# Patient Record
Sex: MALE | Race: BLACK OR AFRICAN AMERICAN | URBAN - METROPOLITAN AREA
[De-identification: names, ages, dates, MRNs, and addresses within clinical notes are randomized per-mention and may not be internally consistent; named-entity substitution may affect disease eponyms.]

---

## 2017-12-13 ENCOUNTER — HISTORICAL (OUTPATIENT)
Dept: ADMINISTRATIVE | Facility: HOSPITAL | Age: 42
End: 2017-12-13

## 2017-12-18 LAB
FINAL CULTURE: NORMAL
FINAL CULTURE: NORMAL

## 2022-05-02 NOTE — HISTORICAL [facility] CERNER
This is a historical note converted from Maria Dolores. Formatting and pictures may have been removed.  Please reference Maria Dolores for original formatting and attached multimedia. Reason for Consultation  buttock abscess  History of Present Illness  43 yo M with T2DM presented to ED with buttock abscess and in DKA. Abscess has been present for ~1 week and is associated with pain, erythema, and purulent drainage. Patient denies fever/chills.  Review of Systems  Negative except as described above  Physical Exam  Vitals & Measurements  T:?36.8? ?C ?(Oral)? HR:?95?(Peripheral)? RR:?18? BP:?115/70? SpO2:?93%? WT:?113.2?kg? WT:?113.2?kg?  NAD, AOx3  NCAT, MMM  Tachycardic, regular rhythm. Lungs CTAB  Abd soft, NTND  Abscess on buttock with purulent drainage, induration with evidence of previous wound opening?  ?  R/B/A of I+D discussed with patient, and patient was consented. The skin was then prepped in a sterile fashion and local anesthetic was administered. After local anesthesia was achieved, an incision was made and the surrounding subcutaneous tissue was spread to allow drainage. Surrounding tissue was sharply debrided. After drainage and debridement, the wound was packed and dressed.  Assessment/Plan  43 yo M with buttock abscess s/p I+D and tissue debridement  ?  -Abscess I+D/tissue debridement performed at bedside  ?  -Recommend vancomycin  ?  -Gensurg will follow patient  ?   Problem List/Past Medical History  Ongoing  Diabetes mellitus type 2 in obese  Obesity  Historical  Procedure/Surgical History  abdominal surgery to umbilicus as a kid  Medications  Inpatient  Dextrose 5% with 0.45% NaCl 1,000 mL, 1000 mL, IV  Dextrose 50% abboject syringe, 12.5 gm= 25 mL, IV Push, q15min, PRN  Lovenox, 40 mg= 0.4 mL, Subcutaneous, Daily  Magnesium Sulfate 2gm/50ml IVPB (Premix), 2 gm= 50 mL, IV Piggyback, As Indicated, PRN  Milk of Magnesia, 30 mL, Oral, Daily, PRN  Novolin R infusion additive 100 units + Sodium Chloride 0.9% 100ml  99 mL  Novolin R infusion additive 100 units + Sodium Chloride 0.9% 100ml 99 mL  NS (0.9% Sodium Chloride) Infusion 1,000 mL, 1000 mL, IV  Sodium Chloride 0.45% 1,000 mL, 1000 mL, IV  Sodium Chloride 0.9% 1,000 mL, 1000 mL, IV  Sodium Chloride 0.9% 1,000 mL, 1000 mL, IV  Sodium Chloride 0.9% 1,000 mL, 1000 mL, IV  sodium phosphate (for IVPB) + Sodium Chloride 0.9% 250ml 250 mL  sodium phosphate (for IVPB) + Sodium Chloride 0.9% 250ml 250 mL  Tylenol, 650 mg= 2 tab(s), Oral, q4hr, PRN  Tylenol, 650 mg= 2 tab(s), Oral, q4hr, PRN  Zofran, 4 mg= 2 mL, IV Push, q4hr, PRN  Zofran, 4 mg= 2 mL, IV Push, q4hr, PRN  Zosyn 3.375 gm (for IVPB), 3.375 gm= 50 mL, IV Piggyback, [id number]  Home  Glucometer, See Instructions,? ?Not taking  metFORMIN 1000 mg oral tablet, 1000 mg= 1 tab(s), Oral, BID, 1 refills,? ?Not taking  metformin 500 mg oral tablet, 1000 mg= 2 tab(s), Oral, Once,? ?Not taking  Allergies  No Known Medication Allergies  Social History  Alcohol - 12/13/2017  Never  Substance Abuse - 12/13/2017  Current, Marijuana, 3-5 times per week  Tobacco - 12/13/2017  Never smoker

## 2022-05-02 NOTE — HISTORICAL [facility] CERNER
This is a historical note converted from Maria Dolores. Formatting and pictures may have been removed.  Please reference Maria Dolores for original formatting and attached multimedia. Chief Complaint  pt. reports to the ed c/o buttock pain and possible ketoacidosis....pt. was seen at [facility] had labs evaluated with a positive serum ketone....history of dm, current bvq=804 mg/dl, vss, nadn...  History of Present Illness  41yo AAM with hx of DM2 treated at [facility] with Metformin, admitted to [facility] on 12/13/17 for DKA. Diagnosed with DM2 about 1 month ago. Non-compliant with metformin.?Found to have CBG in 500s as well as serum ketones at [facility]?Clinic?and was instructed to present to ED for evaluation. In the ED, has complaints of buttox pain for the past week, on exam was inflamed and swollen. Worse when sitting, better with repositioning. States non-compliance with PO metformin. No CP, no SOB, no fever, no chills, no abd pain, no muscle cramping.  Review of Systems  Constitutional symptoms: No fever, no chills.  Skin symptoms: +rash, no pruritus,?+ lesion.  Eye symptoms: No recent vision problems,  ENMT symptoms: no sore throat, no nasal congestion.  Respiratory symptoms: No shortness of breath, no cough.  Cardiovascular symptoms: No chest pain, no peripheral edema.  Gastrointestinal symptoms: No abdominal pain, no nausea, no vomiting, no diarrhea.  Genitourinary symptoms: No dysuria, no hematuria.  Musculoskeletal symptoms: No Joint pain,  Neurologic symptoms: No headache, no dizziness.  Psychiatric symptoms: No anxiety, No depression  Hematologic/Lymphatic symptoms: Bleeding tendency negative,  Additional review of systems information: All other systems reviewed and otherwise negative, All systems reviewed as documented in chart.  Physical Exam  Vitals & Measurements  T:?36.8? ?C ?(Oral)? HR:?95?(Peripheral)? RR:?18? BP:?115/70? SpO2:?93%? WT:?113.2?kg? WT:?113.2?kg?  General: A&O,  NAD  Eye: PERRLA, EOMI, nml conjunctiva  Respiratory: CTA in all lung fields, non-labored, BS equal  Cardiovascular: RRR no M/R/G, No edema  GI: Soft, NT, active BS  : Soft, No CVA tenderness, No suprapubic tenderness  Skin: Warm, left gluteal cleft abscess, tense and mild erythema  Neuro: Cranial Nerves II-XII are grossly intact, No focal deficits, Normal motor function  Psychiatric: Cooperative, Appropriate mood & affect  Assessment/Plan  1. DKA - protocol initiated in ED  2. Left Gluteal Abscess - Bedside I&D by Sx team  3. Obesity  ?  Admit to ICU on DKA protocol. Possible precipitants include infection as has large abscess which was I&D by surgery team. Cultures drawn and empiric Vancomycin and Zosyn initiated. Continue DKA protocol with aggressive IVF hydration. Correct electrolytes as indicated. When appropriate, transition to SQ insulin, possibly later today and advance diet to DM diet.  ?  Consults: Surgery  PPX: Lovenox  Dispo: ICU admit, will have to delay discharge until blood culture return.  ?  ?  Medications (22) Active  Scheduled: (3)  enoxaparin 40mg/0.4ml Inj ?40 mg 0.4 mL, Subcutaneous, Daily  piperacillin-tazobactam ?3.375 gm 50 mL, IV Piggyback, [id number] (6-12-6-12)  vancomycin ?1 gm, IV Piggyback, q12hr  Continuous: (8)  D5W-1/2NS 1,000 mL ?1,000 mL, IV, 150 mL/hr  insulin regular 100 units + sodium chloride 0.9% 99 mL ?99 mL, IV, 8 mL/hr  insulin regular 100 units + sodium chloride 0.9% 99 mL ?99 mL, IV  sodium chloride 0.45% 1,000 mL ?1,000 mL, IV, 150 mL/hr  sodium chloride 0.9% 1,000 mL ?1,000 mL, IV, 1,000 mL/hr  sodium chloride 0.9% 1,000 mL ?1,000 mL, IV, 1,000 mL/hr  sodium chloride 0.9% 1,000 mL ?1,000 mL, IV, 500 mL/hr  sodium chloride 0.9% 1,000 mL ?1,000 mL, IV, 150 mL/hr  PRN: (11)  acetaminophen 325 mg Tab ?650 mg 2 tab(s), Oral, q4hr  acetaminophen 325 mg Tab ?650 mg 2 tab(s), Oral, q4hr  dextrose 50% abboj ?12.5 gm 25 mL, IV Push, q15min  HYDROmorphone 1 mg/mL Soln ?1 mg 1  mL, IV, Once-NOW  lidocaine 1% MDV ?Inj-20ml ?20 mL, EPI, As Directed  magnesium hydroxide 8% Harika (MOM) UD ?30 mL, Oral, Daily  magnesium sulfate INJ ?2 gm 50 mL, IV Piggyback, As Indicated  ondansetron 2 mg/mL inj - 2mL ?4 mg 2 mL, IV Push, q4hr  ondansetron 2 mg/mL inj - 2mL ?4 mg 2 mL, IV Push, q4hr  sodium phosphate + sodium chloride 0.9% 250 mL ?20 mmol 6.67 mL, IV Piggyback, As Directed  sodium phosphate + sodium chloride 0.9% 250 mL ?30 mmol 10 mL, IV Piggyback, As Directed  ?   Problem List/Past Medical History  Ongoing  Diabetes mellitus type 2 in obese  Obesity  Historical  Procedure/Surgical History  abdominal surgery to umbilicus as a kid  Medications  Inpatient  Dextrose 5% with 0.45% NaCl 1,000 mL, 1000 mL, IV  Dextrose 50% abboject syringe, 25 mL, IV Push, q15min, PRN  Lovenox, 40 mg= 0.4 mL, Subcutaneous, Daily  Magnesium Sulfate 2gm/50ml IVPB (Premix), 2 gm= 50 mL, IV Piggyback, As Indicated, PRN  Milk of Magnesia, 30 mL, Oral, Daily, PRN  Novolin R infusion additive 100 units + Sodium Chloride 0.9% 100ml 100 mL  Novolin R infusion additive 100 units + Sodium Chloride 0.9% 100ml 99 mL  NS (0.9% Sodium Chloride) Infusion 1,000 mL, 1000 mL, IV  Sodium Chloride 0.45% 1,000 mL, 1000 mL, IV  Sodium Chloride 0.9% 1,000 mL, 1000 mL, IV  Sodium Chloride 0.9% 1,000 mL, 1000 mL, IV  Sodium Chloride 0.9% 1,000 mL, 1000 mL, IV  sodium phosphate (for IVPB) + Sodium Chloride 0.9% 250ml 250 mL  sodium phosphate (for IVPB) + Sodium Chloride 0.9% 250ml 250 mL  Tylenol, 650 mg= 2 tab(s), Oral, q4hr, PRN  Tylenol, 650 mg= 2 tab(s), Oral, q4hr, PRN  Zofran, 4 mg= 2 mL, IV Push, q4hr, PRN  Zofran, 4 mg= 2 mL, IV Push, q4hr, PRN  Zosyn 3.375 gm (for IVPB), 3.375 gm= 50 mL, IV Piggyback, [id number]  Home  Glucometer, See Instructions,? ?Not taking  metFORMIN 1000 mg oral tablet, 1000 mg= 1 tab(s), Oral, BID, 1 refills,? ?Not taking  metformin 500 mg oral tablet, 1000 mg= 2 tab(s), Oral, Once,? ?Not taking  Allergies  No  Known Medication Allergies  Social History  Alcohol - 12/13/2017  Never  Substance Abuse - 12/13/2017  Current, Marijuana, 3-5 times per week  Tobacco - 12/13/2017  Never smoker  Lab Results  Labs Last 24 Hours?  ?Chemistry Hematology/Coagulation   Sodium Lvl:?133 mmol/L?Low (12/13/17 13:46:11) PT: 13.3 second(s) (12/13/17 13:36:18)   Potassium Lvl: 3.8 mmol/L (12/13/17 13:46:11) INR: 1.03 (12/13/17 13:36:18)   Chloride:?95 mmol/L?Low (12/13/17 13:46:11) PTT: 25.8 second(s) (12/13/17 13:36:19)   CO2:?20 mmol/L?Low (12/13/17 13:46:11) WBC: 8.8 x10(3)/mcL (12/13/17 13:21:38)   Calcium Lvl: 9 mg/dL (12/13/17 13:46:11) RBC: 5.71 x10(6)/mcL (12/13/17 13:21:38)   Glucose Lvl:?532 mg/dL?Critical (12/13/17 13:46:11) Hgb: 14.6 gm/dL (12/13/17 13:21:38)   BUN: 13 mg/dL (12/13/17 13:46:11) Hct: 46.5 % (12/13/17 13:21:38)   Creatinine: 1.3 mg/dL (12/13/17 13:46:11) Platelet: 195 x10(3)/mcL (12/13/17 13:21:38)   eGFR-AA:?78 mL/min?Low (12/13/17 13:46:12) MCV: 81.4 fL (12/13/17 13:21:38)   eGFR-MELANY:?64 mL/min?Low (12/13/17 13:46:13) MCH:?25.6 pg?Low (12/13/17 13:21:38)   Bili Total: 0.4 mg/dL (12/13/17 13:46:11) MCHC: 31.4 gm/dL (12/13/17 13:21:38)   Bili Direct: 0.1 mg/dL (12/13/17 13:46:11) RDW: 14 % (12/13/17 13:21:38)   Bili Indirect: 0.3 mg/dL (12/13/17 13:46:11) MPV:?11.4 fL?High (12/13/17 13:21:38)   AST:?12 unit/L?Low (12/13/17 13:46:11) Abs Neut: 6.43 x10(3)/mcL (12/13/17 13:21:38)   ALT: 25 unit/L (12/13/17 13:46:11) Neutro Auto: 73 x10(3)/mcL (12/13/17 13:21:40)   Alk Phos:?121 unit/L?High (12/13/17 13:46:11) Lymph Auto: 15 % (12/13/17 13:21:40)   Total Protein: 7.1 gm/dL (12/13/17 13:46:11) Mono Auto: 9 % (12/13/17 13:21:40)   Albumin Lvl:?3.2 gm/dL?Low (12/13/17 13:46:11) Eos Auto: 1 % (12/13/17 13:21:40)   Globulin:?3.9 gm/mL?High (12/13/17 13:46:11) Abs Eos: 0.06 (12/13/17 13:21:40)   A/G Ratio: 1 ratio (12/13/17 13:46:11) Basophil Auto: 1 % (12/13/17 13:21:40)   BOHB:?4.57 mmol/L?High (12/13/17 13:43:47) Abs  Neutro: 6.43 x10(3)/mcL (12/13/17 13:21:40)    Abs Lymph: 1.34 x10(3)/mcL (12/13/17 13:21:40)    Abs Mono: 0.77 x10(3)/mcL (12/13/17 13:21:40)    Abs Baso: 0.08 x10(3)/mcL (12/13/17 13:21:40)    IG%: 1 % (12/13/17 13:21:40)    IG#: 0.12 (12/13/17 13:21:40)   ?  ?  ?  ?  Diagnostic Results  Accession:?[id number]  Order:?XR Chest 1 View  Report Dt/Tm:?12/13/2017 14:35  Report:?  one view of the chest  ?  CPT 30393  ?  HISTORY:  Other (please specify)  ?  FINDINGS:  Examination reveals mediastinal and cardiac silhouettes to be within  normal limits. Lung fields are clear and free of gross infiltrates  atelectases or effusions  ?  IMPRESSION: No active pulmonary disease  ?  ?  ?

## 2022-05-02 NOTE — HISTORICAL [facility] CERNER
This is a historical note converted from Maria Dolores. Formatting and pictures may have been removed.  Please reference Maria Dolores for original formatting and attached multimedia. Admission Information  41yo AAM with hx of DM2 treated at [facility] with Metformin, admitted to [facility] on 12/13/17 for DKA. Diagnosed with DM2 about 1 month ago. Non-compliant with metformin.?Found to have CBG in 500s as well as serum ketones at [facility]?Clinic?and was instructed to present to ED for evaluation. In the ED, has complaints of buttox pain for the past week, on exam was inflamed and swollen. Worse when sitting, better with repositioning. States non-compliance with PO metformin. No CP, no SOB, no fever, no chills, no abd pain, no muscle cramping.  ?   HD2: Awake and alert, feeling much better. Transitioned to PO diet and SQ insulin. No cough, no fever, no chills, no pain  HD3: Awake and alert, NAD. States to understand how to administer his own insulin. States received DM education. States to be pleased with his hospital stay. No new issues.  Hospital Course  Significant Findings  Last Month?  ??Lipid Profile: ?Hematology:   ?: ?() ?: ?()   ?: ?() ?: ?()   ?: ?() ?: ?()   ?LDL: 143 mg/dL (12/14/17) ?: ?()   ? ?INR: 1.03 ?(12/13/17)   ?  ?  Additional - Last Month?  ??% Sat Alex: 63.5 % (12/13/17) ?A/G Ratio: 1 ratio (12/15/17) ?Abn Lymph Man: 5 % (12/15/17)   ?Abs Baso: 0.10 x10(3)/mcL (12/14/17) ?Abs Eos: 0.15 ?(12/14/17) ?Abs Lymph: 2.17 x10(3)/mcL (12/14/17)   ?Abs Mono: 0.92 x10(3)/mcL (12/14/17) ?Abs Neut: 3.10 x10(3)/mcL (12/15/17) ?Abs Neutro: 5.45 x10(3)/mcL (12/14/17)   ?Albumin Lvl: 2.7 gm/dL (12/15/17) ?Alk Phos: 86 unit/L (12/15/17) ?ALT: 20 unit/L (12/15/17)   ?Anisocyte: 1+ ?(12/15/17) ?AST: 11 unit/L (12/15/17) ?Band Man: 9 % (12/15/17)   ?Basophil Auto: 1 % (12/14/17) ?Basophil Man: 0 % (12/15/17) ?Bili Direct: 0.1 mg/dL (12/15/17)   ?Bili Indirect: 0.3 mg/dL (12/15/17) ?Bili Total: 0.4 mg/dL (12/15/17)  ?BOHB: 4.79 mmol/L (17)   ?BUN: 7 mg/dL (12/15/17) ?Calcium Lvl: 8.6 mg/dL (12/15/17) ?Chloride: 104 mmol/L (12/15/17)   ?Chol: 209 mg/dL (17) ?Chol/HDL: 7.0 ?(17) ?CO Hgb Alex: 2.4 % (17)   ?CO2: 36 mmol/L (12/15/17) ?CO2 Totl Alex: 23.5 ?(17) ?Creatinine: 0.80 mg/dL (12/15/17)   ?CRP: CRP ?(17) ?D Base Alex: -6.0 ?(17) ?EA mg/dL (17)   ?eGFR-AA: >105 mL/min (12/15/17) ?eGFR-MELANY: >105 mL/min (12/15/17) ?Eos Auto: 2 % (17)   ?Eos Man: 5 % (12/15/17) ?Globulin: 3.50 gm/mL (12/15/17) ?Glucose Lvl: 188 mg/dL (12/15/17)   ?HCO3 Alex: 22 mmol/L (17) ?Hct: 43.8 % (12/15/17) ?HDL: 30 mg/dL (17)   ?Hep A IgM: Nonreactive ?(17) ?Hep B Core IgM: Nonreactive ?(17) ?Hep Bs Ag: Negative ?(17)   ?Hep C Ab: Nonreactive ?(17) ?Hgb: 14.0 gm/dL (12/15/17) ?Hgb A1c: 14.7 % (17)   ?HIV: Nonreactive ?(17) ?IG#: 0.1200 ?(17) ?IG%: 1 % (17)   ?Lactic Acid Lvl: 0.9 mmol/L (17) ?Lymph Auto: 24 % (17) ?Lymph Man: 26.0 % (12/15/17)   ?Macrocyte: 1+ ?(12/15/17) ?Magnesium Lvl: 1.7 mg/dL (17) ?MCH: 25.6 pg (12/15/17)   ?MCHC: 32.0 gm/dL (12/15/17) ?MCV: 80.2 fL (12/15/17) ?Met Hgb Alex: 0.5 % (17)   ?Microcyte: 1+ ?(12/15/17) ?Mono Auto: 10 % (17) ?Monocyte Man: 19 % (12/15/17)   ?MPV: 11.1 fL (12/15/17) ?Myelo Man: 2 % (12/15/17) ?Neutro Auto: 61 x10(3)/mcL (17)   ?O2 Hgb Alex: 61.7 % (17) ?pCO2 Alex: 51.0 mmHg (17) ?pH Alex: 7.24 ?(17)   ?Phosphorus: 2.4 mg/dL (17) ?Platelet: 200 x10(3)/mcL (12/15/17) ?Platelet Est: Adequate ?(12/15/17)   ?pO2 Alex: 31.0 mmHg (17) ?Potassium Lvl: 3.4 mmol/L (12/15/17) ?PT: Prothrombin Time ?(17)   ?PTT: Partial Thromboplastin Time ?(17) ?RBC: 5.46 x10(6)/mcL (12/15/17) ?RBC Morph: Abnormal ?(12/15/17)   ?RDW: 13.9 % (12/15/17) ?Sample Alex: venous ?(17) ?Sed Rate: 28 mm/hr (17)   ?Segs Man: 34 % (12/15/17) ?Site  Alex: Alex Line ?(17) ?Sodium Lvl: 144 mmol/L (12/15/17)   ?THB Alex: 15.0 gm/dL (17) ?Total Protein: 6.2 gm/dL (12/15/17) ?Treatment Alex: room air ?(17)   ?Tri mg/dL (17) ?UA Appear: Clear ?(17) ?UA Bact Interp: None Seen ?(17)   ?UA Bili: - mg/dL (17) ?UA Blood: - mg/dL (17) ?UA Color: COLORLESS ?(17)   ?UA Glucose: >1000 mg/dL (17) ?UA Hyal Cast Interp: 0-2 ?(17) ?UA Ketones: 100 mg/dL (17)   ?UA Leuk Est: - Gautam/uL (17) ?UA Nitrite: - ?(17) ?UA pH: 5.0 ?(17)   ?UA Protein: - mg/dL (17) ?UA RBC Interp: 0-2 ?(17) ?UA Spec Grav: 1.025 ?(17)   ?UA Squam Epi Interp: <1 ?(17) ?UA Urobilinogen: NORMAL mg/dL (17) ?UA WBC Interp: 0-2 /HPF (17)   ?Vanc Lvl Random: 8.2 mcg/mL (17) ?VLDL: 36 mg/dL (17) ?WBC: 6.0 x10(3)/mcL (12/15/17)   ?  ?  Accession:?[id number]  Order:?XR Chest 1 View  Report Dt/Tm:?2017 14:35  Report:?  one view of the chest  ?  CPT 47952  ?  HISTORY:  Other (please specify)  ?  FINDINGS:  Examination reveals mediastinal and cardiac silhouettes to be within  normal limits. Lung fields are clear and free of gross infiltrates  atelectases or effusions  ?  IMPRESSION: No active pulmonary disease  ?  ?  ?  C Blood  ?No growth at 2 days.  ?Collected: 2017 14:08  ??? ? ? Verified by: MICROBIOLOGY, [facility] - 12/15/2017 15:06  ?No growth at 2 days.  ?Collected: 2017 12:56  ??? ? ? Verified by: MICROBIOLOGY, [facility] - 12/15/2017 15:06  ??  ??  Procedures and Treatment Provided  Medications (14) Active  Scheduled: (5)  dextrose 50% abboj ?25 gm 50 mL, IV Push, As Directed  insulin (LanTUS) glargine 100 u/ml Sol PEN ?25 units 0.25 mL, Subcutaneous, At Bedtime  lisinopril 10 mg Tab UD ?10 mg 1 tab(s), Oral, Daily  piperacillin-tazobactam ?3.375 gm 50 mL, IV Piggyback, q8hr  vancomycin ?1,500 mg 1.5 EA, IV Piggyback,  q12hr  Continuous: (0)  PRN: (9)  dextrose 50% abboj ?12.5 gm 25 mL, IV Push, As Directed  dextrose 50% abboj ?12.5 gm 25 mL, IV Push, Once  dextrose 50% abboj ?12.5 gm 25 mL, IV Push, As Directed  glucagon recombinant 1 mg Inj ?1 mg 1 EA, IM, q10min  glucagon recombinant 1 mg Inj ?1 mg 1 EA, IM, q10min  glucose 40% oral gel ?15 gm 0.5 tube(s), Oral, As Directed  HYDROmorphone 1 mg/mL Soln ?1 mg 1 mL, IV, Once-NOW  insulin (Humalog) lispro 100 u/ml Inj ?3-21 units, Subcutaneous, As Directed  lidocaine 1% MDV ?Inj-20ml ?20 mL, EPI, As Directed  ?  ?  12/13/17:  -Abscess I+D/tissue debridement performed at bedside, no complications, tolerated well  Physical Exam  Vitals & Measurements  T:?37.0? ?C ?(Oral)? TMIN:?36.7? ?C ?(Oral)? TMAX:?37.3? ?C ?(Oral)? HR:?90?(Peripheral)? RR:?16? BP:?118/72? SpO2:?97%? WT:?112.3?kg?  General: A&O, NAD  Eye: PERRLA, EOMI, nml conjunctiva  Respiratory: CTA in all lung fields, non-labored, BS equal  Cardiovascular: RRR no M/R/G, No edema  GI: Soft, NT, active BS  : Soft, No CVA tenderness, No suprapubic tenderness  Skin: Warm, left gluteal cleft abscess +dressing in place  Neuro: Cranial Nerves II-XII are grossly intact, No focal deficits, Normal motor function  Psychiatric: Cooperative, Appropriate mood & affect  Discharge Plan  1. DKA - Resolved, transitioned to SQ insulin  2. Left Gluteal Abscess - Bedside I&D by Sx team  3. Obesity  ?   Home Medications (7) Active  aspirin 81 mg oral tablet?81 mg = 1 tab(s), Oral, Daily  Bactrim  mg-160 mg oral tablet?2 tab(s), Oral, BID  Glucometer?See Instructions  Glucometer/ strips and lancets?See Instructions  Levemir FlexPen 100 units/mL subcutaneous solution?30 units, Subcutaneous, Once a day (at bedtime)  losartan 50 mg oral tablet?50 mg = 1 tab(s), Oral, Daily  metFORMIN 1000 mg oral tablet?1,000 mg = 1 tab(s), Oral, BID  ?  Patient Discharge Condition  Stable  Discharge Disposition  DC to home  Follow up in IM clinic in 1-2  weeks  ?  DC time, 40 minutes

## 2024-04-25 ENCOUNTER — OFFICE VISIT (OUTPATIENT)
Dept: URGENT CARE | Facility: CLINIC | Age: 49
End: 2024-04-25

## 2024-04-25 VITALS
OXYGEN SATURATION: 99 % | DIASTOLIC BLOOD PRESSURE: 101 MMHG | HEIGHT: 70 IN | BODY MASS INDEX: 32.93 KG/M2 | WEIGHT: 230 LBS | SYSTOLIC BLOOD PRESSURE: 159 MMHG | HEART RATE: 86 BPM | RESPIRATION RATE: 17 BRPM | TEMPERATURE: 98 F
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
## 2024-04-25 DIAGNOSIS — M77.8 ELBOW TENDINITIS: ICD-10-CM

## 2024-04-25 PROCEDURE — 99203 OFFICE O/P NEW LOW 30 MIN: CPT | Mod: ,,,

## 2024-04-25 RX ORDER — LOVASTATIN 40 MG/1
40 TABLET ORAL
COMMUNITY

## 2024-04-25 RX ORDER — DICLOFENAC SODIUM 75 MG/1
75 TABLET, DELAYED RELEASE ORAL 2 TIMES DAILY
Qty: 14 TABLET | Refills: 0 | Status: SHIPPED | OUTPATIENT
Start: 2024-04-25 | End: 2024-05-02

## 2024-04-25 RX ORDER — SEMAGLUTIDE 0.68 MG/ML
INJECTION, SOLUTION SUBCUTANEOUS
COMMUNITY
Start: 2024-02-02

## 2024-04-25 RX ORDER — SILDENAFIL 50 MG/1
50 TABLET, FILM COATED ORAL DAILY PRN
COMMUNITY
Start: 2024-02-05

## 2024-04-25 RX ORDER — GLIPIZIDE AND METFORMIN HCL 5; 500 MG/1; MG/1
1 TABLET, FILM COATED ORAL 2 TIMES DAILY
COMMUNITY
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
## 2024-04-25 NOTE — PATIENT INSTRUCTIONS
Suspect elbow tendonitis.  Take anti-inflammatory medication 2 times daily to help with pain and inflammation.     Follow up as needed.

## 2025-03-06 ENCOUNTER — HOSPITAL ENCOUNTER (EMERGENCY)
Facility: HOSPITAL | Age: 50
Discharge: HOME | End: 2025-03-06
Attending: FAMILY MEDICINE
Payer: MEDICAID

## 2025-03-06 VITALS
RESPIRATION RATE: 18 BRPM | DIASTOLIC BLOOD PRESSURE: 95 MMHG | BODY MASS INDEX: 31.1 KG/M2 | SYSTOLIC BLOOD PRESSURE: 147 MMHG | TEMPERATURE: 98 F | OXYGEN SATURATION: 98 % | HEART RATE: 98 BPM | HEIGHT: 69 IN | WEIGHT: 210 LBS

## 2025-03-06 DIAGNOSIS — Z76.0 MEDICATION REFILL: ICD-10-CM

## 2025-03-06 DIAGNOSIS — Z86.39 HX OF DIABETES MELLITUS: ICD-10-CM

## 2025-03-06 DIAGNOSIS — M25.562 LEFT KNEE PAIN: Primary | ICD-10-CM

## 2025-03-06 PROCEDURE — 99283 EMERGENCY DEPT VISIT LOW MDM: CPT | Mod: 25

## 2025-03-06 RX ORDER — GLIPIZIDE AND METFORMIN HCL 5; 500 MG/1; MG/1
1 TABLET, FILM COATED ORAL 2 TIMES DAILY
Qty: 60 TABLET | Refills: 0 | Status: SHIPPED | OUTPATIENT
Start: 2025-03-06 | End: 2025-04-05

## 2025-03-06 RX ORDER — MELOXICAM 7.5 MG/1
7.5 TABLET ORAL EVERY 12 HOURS PRN
Qty: 30 TABLET | Refills: 0 | Status: SHIPPED | OUTPATIENT
Start: 2025-03-06 | End: 2025-03-21

## 2025-03-06 RX ORDER — LOVASTATIN 40 MG/1
40 TABLET ORAL DAILY
Qty: 30 TABLET | Refills: 0 | Status: SHIPPED | OUTPATIENT
Start: 2025-03-06 | End: 2025-04-05

## 2025-03-06 RX ORDER — METHOCARBAMOL 750 MG/1
1500 TABLET, FILM COATED ORAL 3 TIMES DAILY
Qty: 42 TABLET | Refills: 0 | Status: SHIPPED | OUTPATIENT
Start: 2025-03-06 | End: 2025-03-13

## 2025-03-06 NOTE — DISCHARGE INSTRUCTIONS
Take home medication as prescribed.     Use ice and elevation.     You have been prescribed Mobic/meloxicam for pain. This is an Non-Steroidal Anti-Inflammatory (NSAID) Medication. Please do not take any additional NSAIDs while you are taking this medication including (Advil, Aleve, Motrin, Ibuprofen, diclofenac, Naprosyn, Toradol, ketoralac, etc.). Please stop taking this medication if you experience: weakness, itching, yellow skin or eyes, joint pains, vomiting blood, blood or black stools, unusual weight gain, or swelling in your arms, legs, hands, or feet.     You have been prescribed Robaxin (Methocarbamol) for muscle spasms/pain. Please do not take this medication while working, drinking alcohol, swimming, or while driving/operating heavy machinery. This medication may cause drowsiness, dizziness, impair judgment, and reduce physical capabilities.You should not drive, operate heavy machinery, or make life changing decisions while taking this medication.

## 2025-03-06 NOTE — ED PROVIDER NOTES
Encounter Date: 3/6/2025       History     Chief Complaint   Patient presents with    Knee Pain     Complaining of left knee pain x3.5 months after falling; went to urgent care at that time; states that pain is unresolved     49-year-old male presents to ED for evaluation of left knee pain for the last 3-1/2 months.  Patient reports 3-1/2 months ago he fell around [facility].  States he had road rash and initially thought that the pain would go away.  Patient reports he was seen at urgent care where he was given diclofenac with no relief.  Patient reports he has full range of motion.  States that he is able to walk.  Reports that at night he has a pain that wakes him up and he is unable to go back to sleep.  No fever or drainage noted.  Patient reports he has also had of his diabetes medication.  States he does not have a PCP and does not follow up with his endocrinologist anymore.  Requesting refills.    The history is provided by the patient. No  was used.     Review of patient's allergies indicates:  No Known Allergies  Past Medical History:   Diagnosis Date    Type 2 diabetes mellitus without complications      Past Surgical History:   Procedure Laterality Date    NO PAST SURGERIES       Family History   Problem Relation Name Age of Onset    Diabetes Mother      Cancer Father      Diabetes Father       Social History[1]  Review of Systems   Constitutional:  Negative for chills, fatigue and fever.   HENT:  Negative for sore throat.    Respiratory:  Negative for cough and shortness of breath.    Cardiovascular:  Negative for chest pain.   Gastrointestinal:  Negative for abdominal pain, nausea and vomiting.   Genitourinary:  Negative for dysuria and frequency.   Musculoskeletal:  Positive for arthralgias, joint swelling and myalgias. Negative for back pain and neck pain.   Skin:  Negative for rash.   Neurological:  Negative for dizziness, weakness and headaches.   Hematological:  Does not  bruise/bleed easily.   All other systems reviewed and are negative.      Physical Exam     Initial Vitals [03/06/25 1354]   BP Pulse Resp Temp SpO2   (!) 147/95 98 18 97.9 °F (36.6 °C) 98 %      MAP       --         Physical Exam    Nursing note and vitals reviewed.  Constitutional: He appears well-developed and well-nourished. He is cooperative.   HENT:   Head: Normocephalic and atraumatic.   Right Ear: Tympanic membrane and external ear normal.   Left Ear: Tympanic membrane and external ear normal. Mouth/Throat: Uvula is midline, oropharynx is clear and moist and mucous membranes are normal. No trismus in the jaw. No uvula swelling.   Eyes: Conjunctivae are normal. Pupils are equal, round, and reactive to light.   Neck: Neck supple.   Normal range of motion.  Cardiovascular:  Normal rate, regular rhythm and normal heart sounds.           Pulmonary/Chest: Breath sounds normal. No respiratory distress. He has no wheezes. He has no rhonchi. He has no rales.   Abdominal: Abdomen is soft. Bowel sounds are normal. There is no abdominal tenderness.   Musculoskeletal:         General: Normal range of motion.      Cervical back: Normal range of motion and neck supple.     Neurological: He is alert and oriented to person, place, and time. He has normal strength. No cranial nerve deficit or sensory deficit. GCS score is 15. GCS eye subscore is 4. GCS verbal subscore is 5. GCS motor subscore is 6.   Skin: Skin is warm and dry. Capillary refill takes less than 2 seconds.   Psychiatric: He has a normal mood and affect.         ED Course   Procedures  Labs Reviewed - No data to display       Imaging Results              X-Ray Knee Complete 4 or More Views Left (Final result)  Result time 03/06/25 14:44:10      Final result by Riley Brown MD (03/06/25 14:44:10)                   Impression:      No acute findings.      Electronically signed by: Riley Brown  Date:    03/06/2025  Time:    14:44               Narrative:     EXAMINATION:  XR KNEE COMP 4 OR MORE VIEWS LEFT    CLINICAL HISTORY:  Pain in left knee    COMPARISON:  None    FINDINGS:  Four views of the left knee.  There is no fracture, dislocation or sizable joint effusion.                                       Medications - No data to display  Medical Decision Making  49-year-old male presents to ED for evaluation of left knee pain for the last 3-1/2 months.  Patient reports 3-1/2 months ago he fell around [facility].  States he had road rash and initially thought that the pain would go away.  Patient reports he was seen at urgent care where he was given diclofenac with no relief.  Patient reports he has full range of motion.  States that he is able to walk.  Reports that at night he has a pain that wakes him up and he is unable to go back to sleep.  No fever or drainage noted.  Patient reports he has also had of his diabetes medication.  States he does not have a PCP and does not follow up with his endocrinologist anymore.  Requesting refills.    Differential diagnosis includes but isn't limited to knee pain, contusion, fracture, dislocation, internal knee injury, diabetes, noncompliance, medication refill    Amount and/or Complexity of Data Reviewed  Radiology: ordered.  Discussion of management or test interpretation with external provider(s): Patient presents to ED for evaluation of left knee pain after a fall 3-1/2 months ago.  Patient has full range of motion.  No signs of infection no redness.  X-ray obtained without any acute findings.  Will place on short course of NSAIDs and muscle relaxers.  Patient also reports that he is a diabetic and does not currently have a PCP.  States he is out of his diabetes medications.  Will refill his diabetes medications as well as his statin.  Patient given a referral into Internal Medicine discussed follow up.  Patient verbalizes understanding and agrees with plan of care.    Risk  OTC drugs.  Prescription drug management.                                       Clinical Impression:  Final diagnoses:  [M25.562] Left knee pain (Primary)  [Z86.39] Hx of diabetes mellitus  [Z76.0] Medication refill          ED Disposition Condition    Discharge Stable          ED Prescriptions       Medication Sig Dispense Start Date End Date Auth. Provider    glipizide-metformin (METAGLIP) 5-500 mg per tablet Take 1 tablet by mouth 2 (two) times daily. 60 tablet 3/6/2025 4/5/2025 Monica Sheffield PA    lovastatin (MEVACOR) 40 MG tablet Take 1 tablet (40 mg total) by mouth Daily. 30 tablet 3/6/2025 4/5/2025 Monica Sheffield PA    meloxicam (MOBIC) 7.5 MG tablet Take 1 tablet (7.5 mg total) by mouth every 12 (twelve) hours as needed for Pain. 30 tablet 3/6/2025 3/21/2025 Monica Sheffield PA    methocarbamoL (ROBAXIN) 750 MG Tab Take 2 tablets (1,500 mg total) by mouth 3 (three) times daily. for 7 days 42 tablet 3/6/2025 3/13/2025 Monica Sheffield PA          Follow-up Information       Follow up With Specialties Details Why Contact Info    PCP  In 1 week As needed, If you do not have a PCP you may call [phone] to help get set up If you do not have a PCP you may call [phone] to help get set up.               [1]   Social History  Tobacco Use    Smoking status: Never     Passive exposure: Never    Smokeless tobacco: Never   Substance Use Topics    Alcohol use: Not Currently    Drug use: Never        Monica Sheffield PA  03/06/25 1500

## 2025-04-15 ENCOUNTER — OFFICE VISIT (OUTPATIENT)
Dept: INTERNAL MEDICINE | Facility: CLINIC | Age: 50
End: 2025-04-15
Payer: MEDICAID

## 2025-04-15 VITALS
DIASTOLIC BLOOD PRESSURE: 101 MMHG | OXYGEN SATURATION: 99 % | RESPIRATION RATE: 18 BRPM | HEIGHT: 69 IN | HEART RATE: 94 BPM | SYSTOLIC BLOOD PRESSURE: 142 MMHG | WEIGHT: 212.31 LBS | BODY MASS INDEX: 31.44 KG/M2

## 2025-04-15 DIAGNOSIS — Z76.89 ENCOUNTER TO ESTABLISH CARE: Primary | ICD-10-CM

## 2025-04-15 DIAGNOSIS — N52.9 ERECTILE DYSFUNCTION, UNSPECIFIED ERECTILE DYSFUNCTION TYPE: ICD-10-CM

## 2025-04-15 DIAGNOSIS — R03.0 ELEVATED BLOOD PRESSURE READING: ICD-10-CM

## 2025-04-15 DIAGNOSIS — M25.562 CHRONIC PAIN OF LEFT KNEE: ICD-10-CM

## 2025-04-15 DIAGNOSIS — Z00.00 WELL ADULT EXAM: ICD-10-CM

## 2025-04-15 DIAGNOSIS — Z12.5 SCREENING FOR MALIGNANT NEOPLASM OF PROSTATE: ICD-10-CM

## 2025-04-15 DIAGNOSIS — Z23 IMMUNIZATION DUE: ICD-10-CM

## 2025-04-15 DIAGNOSIS — E11.65 TYPE 2 DIABETES MELLITUS WITH HYPERGLYCEMIA, WITHOUT LONG-TERM CURRENT USE OF INSULIN: ICD-10-CM

## 2025-04-15 DIAGNOSIS — G89.29 CHRONIC PAIN OF LEFT KNEE: ICD-10-CM

## 2025-04-15 PROCEDURE — 90715 TDAP VACCINE 7 YRS/> IM: CPT | Mod: PBBFAC

## 2025-04-15 PROCEDURE — 99215 OFFICE O/P EST HI 40 MIN: CPT | Mod: PBBFAC

## 2025-04-15 PROCEDURE — 90471 IMMUNIZATION ADMIN: CPT | Mod: PBBFAC

## 2025-04-15 RX ORDER — MELOXICAM 7.5 MG/1
7.5 TABLET ORAL EVERY 12 HOURS
COMMUNITY
End: 2025-04-15 | Stop reason: ALTCHOICE

## 2025-04-15 RX ORDER — GLIPIZIDE AND METFORMIN HCL 5; 500 MG/1; MG/1
1 TABLET, FILM COATED ORAL 2 TIMES DAILY
Qty: 90 TABLET | Refills: 0 | Status: SHIPPED | OUTPATIENT
Start: 2025-04-15

## 2025-04-15 RX ORDER — SILDENAFIL 50 MG/1
50 TABLET, FILM COATED ORAL DAILY PRN
Qty: 15 TABLET | Refills: 3 | Status: SHIPPED | OUTPATIENT
Start: 2025-04-15

## 2025-04-15 RX ORDER — PREDNISONE 20 MG/1
20 TABLET ORAL
COMMUNITY
Start: 2025-01-08 | End: 2025-04-15 | Stop reason: ALTCHOICE

## 2025-04-15 RX ORDER — DICLOFENAC SODIUM 75 MG/1
75 TABLET, DELAYED RELEASE ORAL 2 TIMES DAILY
COMMUNITY
Start: 2025-04-02 | End: 2025-04-15 | Stop reason: SDUPTHER

## 2025-04-15 RX ORDER — LOVASTATIN 40 MG/1
40 TABLET ORAL DAILY
Qty: 90 TABLET | Refills: 0 | Status: SHIPPED | OUTPATIENT
Start: 2025-04-15

## 2025-04-15 RX ORDER — GABAPENTIN 300 MG/1
300 CAPSULE ORAL NIGHTLY
COMMUNITY
Start: 2024-11-25

## 2025-04-15 RX ORDER — DICLOFENAC SODIUM 75 MG/1
75 TABLET, DELAYED RELEASE ORAL 2 TIMES DAILY PRN
Qty: 30 TABLET | Refills: 5 | Status: SHIPPED | OUTPATIENT
Start: 2025-04-15

## 2025-04-15 RX ADMIN — CLOSTRIDIUM TETANI TOXOID ANTIGEN (FORMALDEHYDE INACTIVATED), CORYNEBACTERIUM DIPHTHERIAE TOXOID ANTIGEN (FORMALDEHYDE INACTIVATED), BORDETELLA PERTUSSIS TOXOID ANTIGEN (GLUTARALDEHYDE INACTIVATED), BORDETELLA PERTUSSIS FILAMENTOUS HEMAGGLUTININ ANTIGEN (FORMALDEHYDE INACTIVATED), BORDETELLA PERTUSSIS PERTACTIN ANTIGEN, AND BORDETELLA PERTUSSIS FIMBRIAE 2/3 ANTIGEN 0.5 ML: 5; 2; 2.5; 5; 3; 5 INJECTION, SUSPENSION INTRAMUSCULAR at 11:04

## 2025-04-15 NOTE — ASSESSMENT & PLAN NOTE
Refilled viagra PRN   Report to the ED immediately if you experience any of the following symptoms:  chest pain, lightheadedness, dizziness, SOB, hypotension, nausea, vomiting, or an erection that lasts longer than 4 hours.   Stop taking the medicine immediately!

## 2025-04-15 NOTE — PROGRESS NOTES
Soraya Smallwood, DUSTY   [facility]  [address] [address]      PATIENT NAME: Ziyad Bryant  : 1975  DATE: 4/15/25  MRN: [id number]      Reason for Visit / Chief Complaint: Establish Care (Pt here to est care due to ER visit on 3/6/25 for L knee pain and DM/Pt c/o ongoing L knee pain since 5 months)       History of Present Illness / Problem Focused Workflow     Ziyad Bryant presents to the clinic with Establish Care (Pt here to est care due to ER visit on 3/6/25 for L knee pain and DM/Pt c/o ongoing L knee pain since 5 months)     48 yo male presents to clinic. Medical problems include uncontrolled DM (diagnosed )    4/15/25  Pt presents to establish care following an ED visit for left knee pain at the beginning of March. He has never had primary care. He is reporting left knee pain for 5 months following an injury, xray from ED visit unremarkable. He is agreeable to PT referral. Recommended completing PT and follow up PRN. Blood pressure is elevated, he denies symptoms. He would like to monitor blood pressure at home and keep log prior to starting medication. DM management- Pt was previously followed by endocrinology, he reports A1C has never been well controlled. LOV with endo 2024 but he did not comply with follow up so he is no longer established. He declines STD screening. He reports he has cologuard box at home sent by insurance, encouraged to complete. RTC 3 weeks for DM, blood pressure, wellness with labs          Review of Systems     Review of Systems   Constitutional:  Negative for fatigue, fever and unexpected weight change.   HENT:  Negative for ear pain, hearing loss, trouble swallowing and voice change.    Respiratory:  Negative for cough and shortness of breath.    Cardiovascular:  Negative for chest pain and palpitations.   Gastrointestinal:  Negative for abdominal pain, diarrhea and vomiting.    Genitourinary:  Negative for dysuria.   Musculoskeletal:  Positive for arthralgias. Negative for gait problem.   Skin:  Negative for rash and wound.   Neurological:  Negative for weakness.   Psychiatric/Behavioral:  Negative for suicidal ideas.          Medications and Allergies     Medications  Medication List with Changes/Refills   Current Medications    GABAPENTIN (NEURONTIN) 300 MG CAPSULE    Take 300 mg by mouth every evening.    OZEMPIC 0.25 MG OR 0.5 MG (2 MG/3 ML) PEN INJECTOR    Inject into the skin.   Changed and/or Refilled Medications    Modified Medication Previous Medication    DICLOFENAC (VOLTAREN) 75 MG EC TABLET diclofenac (VOLTAREN) 75 MG EC tablet       Take 1 tablet (75 mg total) by mouth 2 (two) times daily as needed (knee pain).    Take 75 mg by mouth 2 (two) times daily.    GLIPIZIDE-METFORMIN (METAGLIP) 5-500 MG PER TABLET glipizide-metformin (METAGLIP) 5-500 mg per tablet       Take 1 tablet by mouth 2 (two) times daily.    Take 1 tablet by mouth 2 (two) times daily.    LOVASTATIN (MEVACOR) 40 MG TABLET lovastatin (MEVACOR) 40 MG tablet       Take 1 tablet (40 mg total) by mouth Daily.    Take 1 tablet (40 mg total) by mouth Daily.    SILDENAFIL (VIAGRA) 50 MG TABLET sildenafiL (VIAGRA) 50 MG tablet       Take 1 tablet (50 mg total) by mouth daily as needed for Erectile Dysfunction.    Take 50 mg by mouth daily as needed.   Discontinued Medications    MELOXICAM (MOBIC) 7.5 MG TABLET    Take 7.5 mg by mouth every 12 (twelve) hours.    PREDNISONE (DELTASONE) 20 MG TABLET    Take 20 mg by mouth.         Allergies  Review of patient's allergies indicates:   Allergen Reactions    Poison ivy-oak wash [skin cleanser combination no. 3]        Physical Examination     Vitals:    04/15/25 1046   BP: (!) 142/101   Pulse: 94   Resp: 18     Physical Exam  Vitals and nursing note reviewed.   Constitutional:       General: He is not in acute distress.     Appearance: He is not ill-appearing.   HENT:       "Head: Normocephalic and atraumatic.      Mouth/Throat:      Mouth: Mucous membranes are moist.      Pharynx: Oropharynx is clear.   Eyes:      General: No scleral icterus.     Extraocular Movements: Extraocular movements intact.      Conjunctiva/sclera: Conjunctivae normal.      Pupils: Pupils are equal, round, and reactive to light.   Neck:      Vascular: No carotid bruit.   Cardiovascular:      Rate and Rhythm: Normal rate and regular rhythm.      Heart sounds: No murmur heard.     No friction rub. No gallop.   Pulmonary:      Effort: Pulmonary effort is normal. No respiratory distress.      Breath sounds: Normal breath sounds. No wheezing, rhonchi or rales.   Abdominal:      General: Abdomen is flat. Bowel sounds are normal. There is no distension.      Palpations: Abdomen is soft. There is no mass.      Tenderness: There is no abdominal tenderness.   Musculoskeletal:         General: Normal range of motion.      Cervical back: Normal range of motion and neck supple.   Skin:     General: Skin is warm and dry.   Neurological:      General: No focal deficit present.      Mental Status: He is alert.   Psychiatric:         Mood and Affect: Mood normal.           Results   No results found for: "WBC", "RBC", "HGB", "HCT", "MCV", "MCH", "MCHC", "RDW", "PLT", "MPV", "GRAN", "LYMPH", "MONO", "EOS", "BASO", "EOSINOPHIL", "BASOPHIL"  Sodium   Date Value Ref Range Status   04/15/2020 138 136 - 145 mmol/L Final     Potassium   Date Value Ref Range Status   04/15/2020 3.2 (L) 3.5 - 5.1 mmol/L Final     Chloride   Date Value Ref Range Status   04/15/2020 112 (H) 100 - 109 mmol/L Final     Carbon Dioxide   Date Value Ref Range Status   04/15/2020 18 (L) 22 - 33 mmol/L Final     Blood Urea Nitrogen   Date Value Ref Range Status   04/15/2020 11 5 - 25 mg/dL Final     Creatinine   Date Value Ref Range Status   04/15/2020 1.05 0.57 - 1.25 mg/dL Final     Calcium   Date Value Ref Range Status   04/15/2020 8.5 (L) 8.8 - 10.6 mg/dL " "Final     Anion Gap   Date Value Ref Range Status   04/15/2020 8 8 - 16 mmol/L Final     eGFR    Date Value Ref Range Status   04/15/2020 93 >=60 mL/min/1.73mSq Final     No results found for: "CHOL"  No results found for: "HDL"  No results found for: "TRIG"  No results found for: "VLDL"  No results found for: "LDL"  No results found for: "TSH"  No results found for: "PHUR", "SPECGRAV", "PROTEINUA", "GLUCUA", "KETONESU", "OCCULTUA", "NITRITE", "LEUKOCYTESUR"  Lab Results   Component Value Date    HGBA1C >14.0 (H) 04/13/2020           Assessment         ICD-10-CM ICD-9-CM   1. Encounter to establish care  Z76.89 V65.8   2. Left knee pain  M25.562 719.46   3. Type 2 diabetes mellitus with hyperglycemia, without long-term current use of insulin  E11.65 250.00     790.29   4. Screening for malignant neoplasm of prostate  Z12.5 V76.44   5. Well adult exam  Z00.00 V70.0   6. Immunization due  Z23 V05.9   7. Erectile dysfunction, unspecified erectile dysfunction type  N52.9 607.84   8. Elevated blood pressure reading  R03.0 796.2       Plan      Problem List Items Addressed This Visit       Type 2 diabetes mellitus with hyperglycemia, without long-term current use of insulin    Current Assessment & Plan   A1C ordered  Refilled glipizide-metformin 5-500mg BID and lovastatin 40mg  Follow ADA Diet. Avoid soda, simple sweets, and limit rice/pasta/breads/starches (no more than 45-50 grams per meal).  Maintain healthy weight with goal BMI <30.  Exercise 5 times per week for 30 minutes per day.  Stressed importance of daily foot exams.  Stressed importance of annual dilated eye exam.           Relevant Medications    lovastatin (MEVACOR) 40 MG tablet    glipizide-metformin (METAGLIP) 5-500 mg per tablet    Other Relevant Orders    Comprehensive Metabolic Panel    Hemoglobin A1C    Lipid Panel    Microalbumin/Creatinine Ratio, Urine    Ambulatory referral/consult to Ophthalmology    Left knee pain    Current " Assessment & Plan   Xrays on chart- unremarkable  Diclofenac BID PRN  PT referral  Next step discussed MRI if no improvement with PT- pt will contact clinic.   Perform knee stretches daily  Exercise as tolerated (low impact)  Avoid activities that increase the pain or cause stiffness.  Apply heating pad, ice pack, OTC topical as needed.  Massage to loosen muscles.  Continues NSAID as needed  F/U if worsening          Relevant Medications    diclofenac (VOLTAREN) 75 MG EC tablet    Other Relevant Orders    Ambulatory Referral/Consult to Physical Therapy    Encounter to establish care - Primary    Current Assessment & Plan   Reviewed pt history and EMR  Reviewed role of PCP   Wellness labs ordered         Elevated blood pressure reading    Current Assessment & Plan   Pt will monitor BP at home and return to NOV with BP log.   Low Sodium Diet (DASH Diet - Less than 2 grams of sodium per day).  Monitor blood pressure daily and log. Report consistent numbers greater than 140/90.  Maintain healthy weight with goal BMI <30. Exercise 30 minutes per day, 5 days per week.         Erectile dysfunction    Current Assessment & Plan   Refilled viagra PRN   Report to the ED immediately if you experience any of the following symptoms:  chest pain, lightheadedness, dizziness, SOB, hypotension, nausea, vomiting, or an erection that lasts longer than 4 hours.   Stop taking the medicine immediately!            Relevant Medications    sildenafiL (VIAGRA) 50 MG tablet     Other Visit Diagnoses         Screening for malignant neoplasm of prostate        Relevant Orders    PSA, Screening      Well adult exam        Relevant Orders    CBC Auto Differential    Comprehensive Metabolic Panel    Hemoglobin A1C    Lipid Panel    TSH    T4, Free    Urinalysis, Reflex to Urine Culture      Immunization due        Relevant Medications    Tdap vaccine injection 0.5 mL (Completed)            Future Appointments   Date Time Provider Department Center    5/6/2025 10:20 AM Soraya Smallwood, DUSTY [facility]            Signature:      [facility]  [address] [facility] [address]    Date of encounter: 4/15/25

## 2025-04-15 NOTE — ASSESSMENT & PLAN NOTE
A1C ordered  Refilled glipizide-metformin 5-500mg BID and lovastatin 40mg  Follow ADA Diet. Avoid soda, simple sweets, and limit rice/pasta/breads/starches (no more than 45-50 grams per meal).  Maintain healthy weight with goal BMI <30.  Exercise 5 times per week for 30 minutes per day.  Stressed importance of daily foot exams.  Stressed importance of annual dilated eye exam.

## 2025-04-15 NOTE — ASSESSMENT & PLAN NOTE
Xrays on chart- unremarkable  Diclofenac BID PRN  PT referral  Next step discussed MRI if no improvement with PT- pt will contact clinic.   Perform knee stretches daily  Exercise as tolerated (low impact)  Avoid activities that increase the pain or cause stiffness.  Apply heating pad, ice pack, OTC topical as needed.  Massage to loosen muscles.  Continues NSAID as needed  F/U if worsening

## 2025-04-15 NOTE — ASSESSMENT & PLAN NOTE
Pt will monitor BP at home and return to NOV with BP log.   Low Sodium Diet (DASH Diet - Less than 2 grams of sodium per day).  Monitor blood pressure daily and log. Report consistent numbers greater than 140/90.  Maintain healthy weight with goal BMI <30. Exercise 30 minutes per day, 5 days per week.

## 2025-04-15 NOTE — LETTER
AUTHORIZATION FOR RELEASE OF   CONFIDENTIAL INFORMATION    Dear Medical Records,    We are seeing Ziyad Bryant, date of birth 1975, in the clinic at [facility]. Soraya Smallwood FNP is the patient's PCP. Ziyad Bryant has an outstanding lab/procedure at the time we reviewed his chart. In order to help keep his health information updated, he has authorized us to request the following medical record(s):        (  )  MAMMOGRAM                                      (  )  COLONOSCOPY      (  )  PAP SMEAR                                          (  )  OUTSIDE LAB RESULTS     (  )  DEXA SCAN                                          (  )  EYE EXAM            (  )  FOOT EXAM                                          (XXX  )  ENTIRE RECORD     (  )  OUTSIDE IMMUNIZATIONS                 (  )  _______________         Please fax records to [facility], Caletri, Kiley C, FNP, [phone]     If you have any questions, please contact [facility] at [phone].           Patient Name: Ziyad Bryant  : 1975  Patient Phone #: [phone]

## 2025-05-05 ENCOUNTER — RESULTS FOLLOW-UP (OUTPATIENT)
Dept: INTERNAL MEDICINE | Facility: CLINIC | Age: 50
End: 2025-05-05

## 2025-05-05 ENCOUNTER — LAB VISIT (OUTPATIENT)
Dept: LAB | Facility: HOSPITAL | Age: 50
End: 2025-05-05
Payer: MEDICAID

## 2025-05-05 DIAGNOSIS — E11.65 TYPE 2 DIABETES MELLITUS WITH HYPERGLYCEMIA, WITHOUT LONG-TERM CURRENT USE OF INSULIN: ICD-10-CM

## 2025-05-05 DIAGNOSIS — Z12.5 SCREENING FOR MALIGNANT NEOPLASM OF PROSTATE: ICD-10-CM

## 2025-05-05 DIAGNOSIS — Z00.00 WELL ADULT EXAM: ICD-10-CM

## 2025-05-05 LAB
ALBUMIN SERPL-MCNC: 4 G/DL (ref 3.5–5)
ALBUMIN/GLOB SERPL: 1.1 RATIO (ref 1.1–2)
ALP SERPL-CCNC: 71 UNIT/L (ref 40–150)
ALT SERPL-CCNC: 20 UNIT/L (ref 0–55)
ANION GAP SERPL CALC-SCNC: 8 MEQ/L
AST SERPL-CCNC: 13 UNIT/L (ref 11–45)
BACTERIA #/AREA URNS AUTO: ABNORMAL /HPF
BASOPHILS # BLD AUTO: 0.09 X10(3)/MCL
BASOPHILS NFR BLD AUTO: 0.9 %
BILIRUB SERPL-MCNC: 0.4 MG/DL
BILIRUB UR QL STRIP.AUTO: NEGATIVE
BUN SERPL-MCNC: 11.4 MG/DL (ref 8.9–20.6)
CALCIUM SERPL-MCNC: 9.6 MG/DL (ref 8.4–10.2)
CHLORIDE SERPL-SCNC: 102 MMOL/L (ref 98–107)
CHOLEST SERPL-MCNC: 160 MG/DL
CHOLEST/HDLC SERPL: 3 {RATIO} (ref 0–5)
CLARITY UR: CLEAR
CO2 SERPL-SCNC: 30 MMOL/L (ref 22–29)
COLOR UR AUTO: ABNORMAL
CREAT SERPL-MCNC: 1.03 MG/DL (ref 0.72–1.25)
CREAT UR-MCNC: 179 MG/DL (ref 63–166)
CREAT/UREA NIT SERPL: 11
EOSINOPHIL # BLD AUTO: 0.08 X10(3)/MCL (ref 0–0.9)
EOSINOPHIL NFR BLD AUTO: 0.8 %
ERYTHROCYTE [DISTWIDTH] IN BLOOD BY AUTOMATED COUNT: 13.2 % (ref 11.5–17)
EST. AVERAGE GLUCOSE BLD GHB EST-MCNC: 277.6 MG/DL
GFR SERPLBLD CREATININE-BSD FMLA CKD-EPI: >60 ML/MIN/1.73/M2
GLOBULIN SER-MCNC: 3.5 GM/DL (ref 2.4–3.5)
GLUCOSE SERPL-MCNC: 254 MG/DL (ref 74–100)
GLUCOSE UR QL STRIP: ABNORMAL
HBA1C MFR BLD: 11.3 %
HCT VFR BLD AUTO: 47.6 % (ref 42–52)
HDLC SERPL-MCNC: 57 MG/DL (ref 35–60)
HGB BLD-MCNC: 15.3 G/DL (ref 14–18)
HGB UR QL STRIP: NEGATIVE
HYALINE CASTS #/AREA URNS LPF: ABNORMAL /LPF
IMM GRANULOCYTES # BLD AUTO: 0.04 X10(3)/MCL (ref 0–0.04)
IMM GRANULOCYTES NFR BLD AUTO: 0.4 %
KETONES UR QL STRIP: NEGATIVE
LDLC SERPL CALC-MCNC: 87 MG/DL (ref 50–140)
LEUKOCYTE ESTERASE UR QL STRIP: NEGATIVE
LYMPHOCYTES # BLD AUTO: 1.98 X10(3)/MCL (ref 0.6–4.6)
LYMPHOCYTES NFR BLD AUTO: 19.5 %
MCH RBC QN AUTO: 26.9 PG (ref 27–31)
MCHC RBC AUTO-ENTMCNC: 32.1 G/DL (ref 33–36)
MCV RBC AUTO: 83.8 FL (ref 80–94)
MICROALBUMIN UR-MCNC: 10.7 UG/ML
MICROALBUMIN/CREAT RATIO PNL UR: 6 MG/GM CR (ref 0–30)
MONOCYTES # BLD AUTO: 0.7 X10(3)/MCL (ref 0.1–1.3)
MONOCYTES NFR BLD AUTO: 6.9 %
NEUTROPHILS # BLD AUTO: 7.28 X10(3)/MCL (ref 2.1–9.2)
NEUTROPHILS NFR BLD AUTO: 71.5 %
NITRITE UR QL STRIP: NEGATIVE
NRBC BLD AUTO-RTO: 0 %
PH UR STRIP: 5.5 [PH]
PLATELET # BLD AUTO: 207 X10(3)/MCL (ref 130–400)
PMV BLD AUTO: 10.1 FL (ref 7.4–10.4)
POTASSIUM SERPL-SCNC: 4.4 MMOL/L (ref 3.5–5.1)
PROT SERPL-MCNC: 7.5 GM/DL (ref 6.4–8.3)
PROT UR QL STRIP: NEGATIVE
PSA SERPL-MCNC: 0.98 NG/ML
RBC # BLD AUTO: 5.68 X10(6)/MCL (ref 4.7–6.1)
RBC #/AREA URNS AUTO: ABNORMAL /HPF
SODIUM SERPL-SCNC: 140 MMOL/L (ref 136–145)
SP GR UR STRIP.AUTO: 1.03 (ref 1–1.03)
SQUAMOUS #/AREA URNS LPF: ABNORMAL /HPF
T4 FREE SERPL-MCNC: 1.06 NG/DL (ref 0.7–1.48)
TRIGL SERPL-MCNC: 79 MG/DL (ref 34–140)
TSH SERPL-ACNC: 1.21 UIU/ML (ref 0.35–4.94)
UROBILINOGEN UR STRIP-ACNC: NORMAL
VLDLC SERPL CALC-MCNC: 16 MG/DL
WBC # BLD AUTO: 10.17 X10(3)/MCL (ref 4.5–11.5)
WBC #/AREA URNS AUTO: ABNORMAL /HPF

## 2025-05-05 PROCEDURE — 84443 ASSAY THYROID STIM HORMONE: CPT

## 2025-05-05 PROCEDURE — 83036 HEMOGLOBIN GLYCOSYLATED A1C: CPT

## 2025-05-05 PROCEDURE — 84153 ASSAY OF PSA TOTAL: CPT

## 2025-05-05 PROCEDURE — 80061 LIPID PANEL: CPT

## 2025-05-05 PROCEDURE — 80053 COMPREHEN METABOLIC PANEL: CPT

## 2025-05-05 PROCEDURE — 84439 ASSAY OF FREE THYROXINE: CPT

## 2025-05-05 PROCEDURE — 82570 ASSAY OF URINE CREATININE: CPT

## 2025-05-05 PROCEDURE — 36415 COLL VENOUS BLD VENIPUNCTURE: CPT

## 2025-05-05 PROCEDURE — 85025 COMPLETE CBC W/AUTO DIFF WBC: CPT

## 2025-05-05 PROCEDURE — 81015 MICROSCOPIC EXAM OF URINE: CPT

## 2025-05-06 ENCOUNTER — OFFICE VISIT (OUTPATIENT)
Dept: INTERNAL MEDICINE | Facility: CLINIC | Age: 50
End: 2025-05-06
Payer: MEDICAID

## 2025-05-06 VITALS
OXYGEN SATURATION: 99 % | DIASTOLIC BLOOD PRESSURE: 88 MMHG | HEART RATE: 99 BPM | HEIGHT: 69 IN | BODY MASS INDEX: 31.74 KG/M2 | SYSTOLIC BLOOD PRESSURE: 148 MMHG | RESPIRATION RATE: 18 BRPM | WEIGHT: 214.31 LBS

## 2025-05-06 DIAGNOSIS — Z00.00 WELL ADULT EXAM: Primary | ICD-10-CM

## 2025-05-06 DIAGNOSIS — I10 PRIMARY HYPERTENSION: ICD-10-CM

## 2025-05-06 DIAGNOSIS — E11.65 TYPE 2 DIABETES MELLITUS WITH HYPERGLYCEMIA, WITHOUT LONG-TERM CURRENT USE OF INSULIN: ICD-10-CM

## 2025-05-06 PROCEDURE — 99214 OFFICE O/P EST MOD 30 MIN: CPT | Mod: PBBFAC

## 2025-05-06 RX ORDER — SEMAGLUTIDE 0.68 MG/ML
0.25 INJECTION, SOLUTION SUBCUTANEOUS
Qty: 3 ML | Refills: 0 | Status: SHIPPED | OUTPATIENT
Start: 2025-05-06

## 2025-05-06 RX ORDER — LOVASTATIN 40 MG/1
40 TABLET ORAL DAILY
Qty: 90 TABLET | Refills: 2 | Status: SHIPPED | OUTPATIENT
Start: 2025-05-06 | End: 2026-01-31

## 2025-05-06 RX ORDER — GLIPIZIDE AND METFORMIN HCL 5; 500 MG/1; MG/1
2 TABLET, FILM COATED ORAL 2 TIMES DAILY
Qty: 360 TABLET | Refills: 2 | Status: SHIPPED | OUTPATIENT
Start: 2025-05-06 | End: 2026-01-31

## 2025-05-06 RX ORDER — LISINOPRIL 10 MG/1
10 TABLET ORAL DAILY
Qty: 90 TABLET | Refills: 0 | Status: SHIPPED | OUTPATIENT
Start: 2025-05-06

## 2025-05-06 NOTE — PROGRESS NOTES
Soraya Smallwood, DUSTY   [facility]  [address] [facility] [address]      PATIENT NAME: Ziyad Bryant  : 1975  DATE: 25  MRN: [id number]      Reason for Visit / Chief Complaint: wellness (Pt here for wellness with lab review)       History of Present Illness / Problem Focused Workflow     Ziyad Bryant presents to the clinic with wellness (Pt here for wellness with lab review)     50 yo male presents to clinic. Medical problems include uncontrolled DM (diagnosed )    4/15/25  Pt presents to establish care following an ED visit for left knee pain at the beginning of March. He has never had primary care. He is reporting left knee pain for 5 months following an injury, xray from ED visit unremarkable. He is agreeable to PT referral. Recommended completing PT and follow up PRN. Blood pressure is elevated, he denies symptoms. He would like to monitor blood pressure at home and keep log prior to starting medication. DM management- Pt was previously followed by endocrinology, he reports A1C has never been well controlled. LOV with endo 2024 but he did not comply with follow up so he is no longer established. He declines STD screening. He reports he has cologuard box at home sent by insurance, encouraged to complete. RTC 3 weeks for DM, blood pressure, wellness with labs    25  Pt presents for wellness and lab review. A1C 11.3, otherwise labs with no significant abnormal findings. He has been on ozempic in the past for about a month but stopped due to fear of side effects he saw online. He is agreeable to resume. Will also increase dose of glipizide-metformin to 2 tabs BID. Reviewed ADA diet, he reports he mostly is compliant with it. Blood pressure remains elevated, he did not monitor at home. Agreeable to start lisinopril 10mg and RTC in a couple weeks. Will follow up 3 weeks for HTN and DM medication  management.          Review of Systems     Review of Systems   Constitutional:  Negative for fatigue, fever and unexpected weight change.   HENT:  Negative for ear pain, hearing loss, trouble swallowing and voice change.    Respiratory:  Negative for cough and shortness of breath.    Cardiovascular:  Negative for chest pain and palpitations.   Gastrointestinal:  Negative for abdominal pain, diarrhea and vomiting.   Genitourinary:  Negative for dysuria.   Musculoskeletal:  Negative for gait problem.   Skin:  Negative for rash and wound.   Neurological:  Negative for weakness.   Psychiatric/Behavioral:  Negative for suicidal ideas.          Medications and Allergies     Medications  Medication List with Changes/Refills   New Medications    LISINOPRIL 10 MG TABLET    Take 1 tablet (10 mg total) by mouth once daily.    SEMAGLUTIDE (OZEMPIC) 0.25 MG OR 0.5 MG (2 MG/3 ML) PEN INJECTOR    Inject 0.25 mg into the skin every 7 days.   Current Medications    DICLOFENAC (VOLTAREN) 75 MG EC TABLET    Take 1 tablet (75 mg total) by mouth 2 (two) times daily as needed (knee pain).    GABAPENTIN (NEURONTIN) 300 MG CAPSULE    Take 300 mg by mouth every evening.    SILDENAFIL (VIAGRA) 50 MG TABLET    Take 1 tablet (50 mg total) by mouth daily as needed for Erectile Dysfunction.   Changed and/or Refilled Medications    Modified Medication Previous Medication    GLIPIZIDE-METFORMIN (METAGLIP) 5-500 MG PER TABLET glipizide-metformin (METAGLIP) 5-500 mg per tablet       Take 2 tablets by mouth 2 (two) times daily.    Take 1 tablet by mouth 2 (two) times daily.    LOVASTATIN (MEVACOR) 40 MG TABLET lovastatin (MEVACOR) 40 MG tablet       Take 1 tablet (40 mg total) by mouth Daily.    Take 1 tablet (40 mg total) by mouth Daily.   Discontinued Medications    OZEMPIC 0.25 MG OR 0.5 MG (2 MG/3 ML) PEN INJECTOR    Inject into the skin.         Allergies  Review of patient's allergies indicates:   Allergen Reactions    Poison ivy-oak wash [skin  cleanser combination no. 3]        Physical Examination     Vitals:    05/06/25 1035   BP: (!) 148/88   Pulse: 99   Resp: 18     Physical Exam  Vitals and nursing note reviewed.   Constitutional:       General: He is not in acute distress.     Appearance: He is not ill-appearing.   HENT:      Head: Normocephalic and atraumatic.      Mouth/Throat:      Mouth: Mucous membranes are moist.      Pharynx: Oropharynx is clear.   Eyes:      General: No scleral icterus.     Extraocular Movements: Extraocular movements intact.      Conjunctiva/sclera: Conjunctivae normal.      Pupils: Pupils are equal, round, and reactive to light.   Neck:      Vascular: No carotid bruit.   Cardiovascular:      Rate and Rhythm: Normal rate and regular rhythm.      Heart sounds: No murmur heard.     No friction rub. No gallop.   Pulmonary:      Effort: Pulmonary effort is normal. No respiratory distress.      Breath sounds: Normal breath sounds. No wheezing, rhonchi or rales.   Abdominal:      General: Abdomen is flat. Bowel sounds are normal. There is no distension.      Palpations: Abdomen is soft. There is no mass.      Tenderness: There is no abdominal tenderness.   Musculoskeletal:         General: Normal range of motion.      Cervical back: Normal range of motion and neck supple.   Skin:     General: Skin is warm and dry.   Neurological:      General: No focal deficit present.      Mental Status: He is alert.   Psychiatric:         Mood and Affect: Mood normal.           Results     Lab Results   Component Value Date    WBC 10.17 05/05/2025    RBC 5.68 05/05/2025    HGB 15.3 05/05/2025    HCT 47.6 05/05/2025    MCV 83.8 05/05/2025    MCH 26.9 (L) 05/05/2025    MCHC 32.1 (L) 05/05/2025    RDW 13.2 05/05/2025     05/05/2025    MPV 10.1 05/05/2025     Sodium   Date Value Ref Range Status   05/05/2025 140 136 - 145 mmol/L Final   04/15/2020 138 136 - 145 mmol/L Final     Potassium   Date Value Ref Range Status   05/05/2025 4.4 3.5 -  5.1 mmol/L Final   04/15/2020 3.2 (L) 3.5 - 5.1 mmol/L Final     Chloride   Date Value Ref Range Status   05/05/2025 102 98 - 107 mmol/L Final   04/15/2020 112 (H) 100 - 109 mmol/L Final     CO2   Date Value Ref Range Status   05/05/2025 30 (H) 22 - 29 mmol/L Final     Carbon Dioxide   Date Value Ref Range Status   04/15/2020 18 (L) 22 - 33 mmol/L Final     Glucose   Date Value Ref Range Status   05/05/2025 254 (H) 74 - 100 mg/dL Final     Blood Urea Nitrogen   Date Value Ref Range Status   05/05/2025 11.4 8.9 - 20.6 mg/dL Final   04/15/2020 11 5 - 25 mg/dL Final     Creatinine   Date Value Ref Range Status   05/05/2025 1.03 0.72 - 1.25 mg/dL Final   04/15/2020 1.05 0.57 - 1.25 mg/dL Final     Calcium   Date Value Ref Range Status   05/05/2025 9.6 8.4 - 10.2 mg/dL Final   04/15/2020 8.5 (L) 8.8 - 10.6 mg/dL Final     Protein Total   Date Value Ref Range Status   05/05/2025 7.5 6.4 - 8.3 gm/dL Final     Albumin   Date Value Ref Range Status   05/05/2025 4.0 3.5 - 5.0 g/dL Final     Bilirubin Total   Date Value Ref Range Status   05/05/2025 0.4 <=1.5 mg/dL Final     ALP   Date Value Ref Range Status   05/05/2025 71 40 - 150 unit/L Final     AST   Date Value Ref Range Status   05/05/2025 13 11 - 45 unit/L Final     ALT   Date Value Ref Range Status   05/05/2025 20 0 - 55 unit/L Final     Anion Gap   Date Value Ref Range Status   04/15/2020 8 8 - 16 mmol/L Final     eGFR    Date Value Ref Range Status   04/15/2020 93 >=60 mL/min/1.73mSq Final     Lab Results   Component Value Date    CHOL 160 05/05/2025     Lab Results   Component Value Date    HDL 57 05/05/2025     Lab Results   Component Value Date    TRIG 79 05/05/2025     Lab Results   Component Value Date    VLDL 16 05/05/2025     Lab Results   Component Value Date    LDL 87.00 05/05/2025     Lab Results   Component Value Date    TSH 1.205 05/05/2025     Lab Results   Component Value Date    PHUR 5.5 05/05/2025    PROTEINUA Negative 05/05/2025     GLUCUA 4+ (A) 05/05/2025    OCCULTUA Negative 05/05/2025    NITRITE Negative 05/05/2025    LEUKOCYTESUR Negative 05/05/2025     Lab Results   Component Value Date    HGBA1C 11.3 (H) 05/05/2025    HGBA1C >14.0 (H) 04/13/2020           Assessment         ICD-10-CM ICD-9-CM   1. Well adult exam  Z00.00 V70.0   2. Primary hypertension  I10 401.9   3. Type 2 diabetes mellitus with hyperglycemia, without long-term current use of insulin  E11.65 250.00     790.29       Plan      Problem List Items Addressed This Visit       Type 2 diabetes mellitus with hyperglycemia, without long-term current use of insulin    Current Assessment & Plan   A1C 11.3  Continue glipizide-metformin 5-500mg BID and lovastatin 40mg  Start ozempic 0.25mg x 4 weeks  Follow ADA Diet. Avoid soda, simple sweets, and limit rice/pasta/breads/starches (no more than 45-50 grams per meal).  Maintain healthy weight with goal BMI <30.  Exercise 5 times per week for 30 minutes per day.  Stressed importance of daily foot exams.  Stressed importance of annual dilated eye exam.           Relevant Medications    glipizide-metformin (METAGLIP) 5-500 mg per tablet    semaglutide (OZEMPIC) 0.25 mg or 0.5 mg (2 mg/3 mL) pen injector    lovastatin (MEVACOR) 40 MG tablet    Well adult exam - Primary    Current Assessment & Plan   Pt wellness visit completed today with appropriate lab work.   HM Topics reviewed and updated- reinforced to complete cologuard  Immunizations Discussed  Discussed healthy diet and encouraged to exercise routinely  Encouraged adequate water intake  Eat more fruits and vegetables  Avoid soda & alcohol           Primary hypertension    Current Assessment & Plan   Did not monitor  Start lisinopril 10mg, RTC 3 weeks  Low Sodium Diet (DASH Diet - Less than 2 grams of sodium per day).  Monitor blood pressure daily and log. Report consistent numbers greater than 140/90.  Maintain healthy weight with goal BMI <30. Exercise 30 minutes per day, 5 days  per week.         Relevant Medications    lisinopriL 10 MG tablet       Future Appointments   Date Time Provider Department Center   5/28/2025  8:20 AM Soraya Smallwood, [facility]    9/2/2025  7:30 AM [facility] [place] [place]            Signature:      [facility]  [address] [address]    Date of encounter: 5/6/25

## 2025-05-06 NOTE — ASSESSMENT & PLAN NOTE
A1C 11.3  Continue glipizide-metformin 5-500mg BID and lovastatin 40mg  Start ozempic 0.25mg x 4 weeks  Follow ADA Diet. Avoid soda, simple sweets, and limit rice/pasta/breads/starches (no more than 45-50 grams per meal).  Maintain healthy weight with goal BMI <30.  Exercise 5 times per week for 30 minutes per day.  Stressed importance of daily foot exams.  Stressed importance of annual dilated eye exam.

## 2025-05-06 NOTE — ASSESSMENT & PLAN NOTE
Did not monitor  Start lisinopril 10mg, RTC 3 weeks  Low Sodium Diet (DASH Diet - Less than 2 grams of sodium per day).  Monitor blood pressure daily and log. Report consistent numbers greater than 140/90.  Maintain healthy weight with goal BMI <30. Exercise 30 minutes per day, 5 days per week.

## 2025-05-06 NOTE — ASSESSMENT & PLAN NOTE
Pt wellness visit completed today with appropriate lab work.   HM Topics reviewed and updated- reinforced to complete cologuard  Immunizations Discussed  Discussed healthy diet and encouraged to exercise routinely  Encouraged adequate water intake  Eat more fruits and vegetables  Avoid soda & alcohol

## 2025-05-13 RX ORDER — GABAPENTIN 300 MG/1
300 CAPSULE ORAL NIGHTLY
Qty: 90 CAPSULE | Refills: 2 | Status: SHIPPED | OUTPATIENT
Start: 2025-05-13 | End: 2026-02-07

## 2025-05-28 ENCOUNTER — OFFICE VISIT (OUTPATIENT)
Dept: INTERNAL MEDICINE | Facility: CLINIC | Age: 50
End: 2025-05-28
Payer: MEDICAID

## 2025-05-28 VITALS
HEART RATE: 92 BPM | BODY MASS INDEX: 32.58 KG/M2 | DIASTOLIC BLOOD PRESSURE: 86 MMHG | TEMPERATURE: 98 F | WEIGHT: 220 LBS | SYSTOLIC BLOOD PRESSURE: 138 MMHG | HEIGHT: 69 IN | RESPIRATION RATE: 16 BRPM | OXYGEN SATURATION: 100 %

## 2025-05-28 DIAGNOSIS — Z11.59 NEED FOR HEPATITIS C SCREENING TEST: ICD-10-CM

## 2025-05-28 DIAGNOSIS — I10 PRIMARY HYPERTENSION: ICD-10-CM

## 2025-05-28 DIAGNOSIS — M25.562 CHRONIC PAIN OF LEFT KNEE: Primary | ICD-10-CM

## 2025-05-28 DIAGNOSIS — N52.9 ERECTILE DYSFUNCTION, UNSPECIFIED ERECTILE DYSFUNCTION TYPE: ICD-10-CM

## 2025-05-28 DIAGNOSIS — E11.65 TYPE 2 DIABETES MELLITUS WITH HYPERGLYCEMIA, WITHOUT LONG-TERM CURRENT USE OF INSULIN: ICD-10-CM

## 2025-05-28 DIAGNOSIS — G89.29 CHRONIC PAIN OF LEFT KNEE: Primary | ICD-10-CM

## 2025-05-28 PROCEDURE — 3061F NEG MICROALBUMINURIA REV: CPT | Mod: CPTII,,,

## 2025-05-28 PROCEDURE — 1160F RVW MEDS BY RX/DR IN RCRD: CPT | Mod: CPTII,,,

## 2025-05-28 PROCEDURE — 3046F HEMOGLOBIN A1C LEVEL >9.0%: CPT | Mod: CPTII,,,

## 2025-05-28 PROCEDURE — 4010F ACE/ARB THERAPY RXD/TAKEN: CPT | Mod: CPTII,,,

## 2025-05-28 PROCEDURE — 3066F NEPHROPATHY DOC TX: CPT | Mod: CPTII,,,

## 2025-05-28 PROCEDURE — 1159F MED LIST DOCD IN RCRD: CPT | Mod: CPTII,,,

## 2025-05-28 PROCEDURE — 3075F SYST BP GE 130 - 139MM HG: CPT | Mod: CPTII,,,

## 2025-05-28 PROCEDURE — 99215 OFFICE O/P EST HI 40 MIN: CPT | Mod: PBBFAC

## 2025-05-28 PROCEDURE — 3079F DIAST BP 80-89 MM HG: CPT | Mod: CPTII,,,

## 2025-05-28 PROCEDURE — 99214 OFFICE O/P EST MOD 30 MIN: CPT | Mod: S$PBB,,,

## 2025-05-28 PROCEDURE — 3008F BODY MASS INDEX DOCD: CPT | Mod: CPTII,,,

## 2025-05-28 RX ORDER — TIZANIDINE 4 MG/1
4 TABLET ORAL NIGHTLY PRN
Qty: 30 TABLET | Refills: 3 | Status: SHIPPED | OUTPATIENT
Start: 2025-05-28

## 2025-05-28 RX ORDER — LISINOPRIL 10 MG/1
10 TABLET ORAL DAILY
Qty: 90 TABLET | Refills: 2 | Status: SHIPPED | OUTPATIENT
Start: 2025-05-28 | End: 2026-02-22

## 2025-05-28 RX ORDER — SEMAGLUTIDE 0.68 MG/ML
0.5 INJECTION, SOLUTION SUBCUTANEOUS
Qty: 3 ML | Refills: 3 | Status: SHIPPED | OUTPATIENT
Start: 2025-05-28

## 2025-05-28 RX ORDER — SILDENAFIL 50 MG/1
50 TABLET, FILM COATED ORAL DAILY PRN
Qty: 30 TABLET | Refills: 3 | Status: SHIPPED | OUTPATIENT
Start: 2025-05-28

## 2025-05-28 NOTE — ASSESSMENT & PLAN NOTE
Xrays on chart- unremarkable  Diclofenac BID PRN  Currently in PT, ortho referral placed.  Perform knee stretches daily  Exercise as tolerated (low impact)  Avoid activities that increase the pain or cause stiffness.  Apply heating pad, ice pack, OTC topical as needed.  Massage to loosen muscles.  Continues NSAID as needed  F/U if worsening

## 2025-05-28 NOTE — ASSESSMENT & PLAN NOTE
A1C 11.3  Continue glipizide-metformin 5-500mg BID and lovastatin 40mg  Start ozempic 0.25mg x 4 weeks then inc to 0.5mg weekly  Follow ADA Diet. Avoid soda, simple sweets, and limit rice/pasta/breads/starches (no more than 45-50 grams per meal).  Maintain healthy weight with goal BMI <30.  Exercise 5 times per week for 30 minutes per day.  Stressed importance of daily foot exams.  Stressed importance of annual dilated eye exam.

## 2025-05-28 NOTE — ASSESSMENT & PLAN NOTE
At goal  Continue lisinopril 10mg  Low Sodium Diet (DASH Diet - Less than 2 grams of sodium per day).  Monitor blood pressure daily and log. Report consistent numbers greater than 140/90.  Maintain healthy weight with goal BMI <30. Exercise 30 minutes per day, 5 days per week.

## 2025-05-28 NOTE — PROGRESS NOTES
Soraya Smallwood, DUSTY   [facility]  [address] [facility] [address]      PATIENT NAME: Ziyad Bryant  : 1975  DATE: 25  MRN: [id number]      Reason for Visit / Chief Complaint: Hypertension and Diabetes       History of Present Illness / Problem Focused Workflow     Ziyad Bryant presents to the clinic with Hypertension and Diabetes     48 yo male presents to clinic. Medical problems include uncontrolled DM (diagnosed )    4/15/25  Pt presents to establish care following an ED visit for left knee pain at the beginning of March. He has never had primary care. He is reporting left knee pain for 5 months following an injury, xray from ED visit unremarkable. He is agreeable to PT referral. Recommended completing PT and follow up PRN. Blood pressure is elevated, he denies symptoms. He would like to monitor blood pressure at home and keep log prior to starting medication. DM management- Pt was previously followed by endocrinology, he reports A1C has never been well controlled. LOV with endo 2024 but he did not comply with follow up so he is no longer established. He declines STD screening. He reports he has cologuard box at home sent by insurance, encouraged to complete. RTC 3 weeks for DM, blood pressure, wellness with labs    25  Pt presents for wellness and lab review. A1C 11.3, otherwise labs with no significant abnormal findings. He has been on ozempic in the past for about a month but stopped due to fear of side effects he saw online. He is agreeable to resume. Will also increase dose of glipizide-metformin to 2 tabs BID. Reviewed ADA diet, he reports he mostly is compliant with it. Blood pressure remains elevated, he did not monitor at home. Agreeable to start lisinopril 10mg and RTC in a couple weeks. Will follow up 3 weeks for HTN and DM medication management.    25  Pt presents for HTN and DM medication  management. He was started on lisinopril 10mg LOV, blood pressure today at goal and he has tolerated medication well. He has not started ozempic yet, he reports he just hasn't but will. Reinforced 0.25mg x 4 weeks then increase to 0.5mg if tolerated. He is agreeable. Will follow up in 4 months for DM labs and med titration. He reports therapy his helping his knee but he wakes up during the night with pain, will rx muscle relaxer and refer to ortho for further eval/management. He also is having left elbow pain for the last week or so, no injury, pain is mild soreness. Encouraged him to use medications he currently has and follow up PRN. Reinforced to complete cologuard. RTC virtually in Sept with labs prior or PRN for issues with ozempic.             Review of Systems     Review of Systems   Constitutional:  Negative for fatigue, fever and unexpected weight change.   HENT:  Negative for ear pain, hearing loss, trouble swallowing and voice change.    Respiratory:  Negative for cough and shortness of breath.    Cardiovascular:  Negative for chest pain and palpitations.   Gastrointestinal:  Negative for abdominal pain, diarrhea and vomiting.   Genitourinary:  Negative for dysuria.   Musculoskeletal:  Positive for arthralgias. Negative for gait problem.   Skin:  Negative for rash and wound.   Neurological:  Negative for weakness.   Psychiatric/Behavioral:  Negative for suicidal ideas.          Medications and Allergies     Medications  Medication List with Changes/Refills   New Medications    TIZANIDINE (ZANAFLEX) 4 MG TABLET    Take 1 tablet (4 mg total) by mouth nightly as needed (knee pain).   Current Medications    DICLOFENAC (VOLTAREN) 75 MG EC TABLET    Take 1 tablet (75 mg total) by mouth 2 (two) times daily as needed (knee pain).    GABAPENTIN (NEURONTIN) 300 MG CAPSULE    Take 1 capsule (300 mg total) by mouth every evening.    GLIPIZIDE-METFORMIN (METAGLIP) 5-500 MG PER TABLET    Take 2 tablets by mouth 2 (two)  times daily.    LOVASTATIN (MEVACOR) 40 MG TABLET    Take 1 tablet (40 mg total) by mouth Daily.   Changed and/or Refilled Medications    Modified Medication Previous Medication    LISINOPRIL 10 MG TABLET lisinopriL 10 MG tablet       Take 1 tablet (10 mg total) by mouth once daily.    Take 1 tablet (10 mg total) by mouth once daily.    SEMAGLUTIDE (OZEMPIC) 0.25 MG OR 0.5 MG (2 MG/3 ML) PEN INJECTOR semaglutide (OZEMPIC) 0.25 mg or 0.5 mg (2 mg/3 mL) pen injector       Inject 0.5 mg into the skin every 7 days.    Inject 0.25 mg into the skin every 7 days.    SILDENAFIL (VIAGRA) 50 MG TABLET sildenafiL (VIAGRA) 50 MG tablet       Take 1 tablet (50 mg total) by mouth daily as needed for Erectile Dysfunction.    Take 1 tablet (50 mg total) by mouth daily as needed for Erectile Dysfunction.         Allergies  Review of patient's allergies indicates:   Allergen Reactions    Poison ivy-oak wash [skin cleanser combination no. 3]        Physical Examination     Vitals:    05/28/25 1242   BP: 138/86   Pulse: 92   Resp: 16   Temp: 98.2 °F (36.8 °C)     Physical Exam  Vitals and nursing note reviewed.   Constitutional:       General: He is not in acute distress.     Appearance: He is not ill-appearing.   HENT:      Head: Normocephalic and atraumatic.      Mouth/Throat:      Mouth: Mucous membranes are moist.      Pharynx: Oropharynx is clear.   Eyes:      General: No scleral icterus.     Extraocular Movements: Extraocular movements intact.      Conjunctiva/sclera: Conjunctivae normal.      Pupils: Pupils are equal, round, and reactive to light.   Neck:      Vascular: No carotid bruit.   Cardiovascular:      Rate and Rhythm: Normal rate and regular rhythm.      Heart sounds: No murmur heard.     No friction rub. No gallop.   Pulmonary:      Effort: Pulmonary effort is normal. No respiratory distress.      Breath sounds: Normal breath sounds. No wheezing, rhonchi or rales.   Abdominal:      General: Abdomen is flat. Bowel  sounds are normal. There is no distension.      Palpations: Abdomen is soft. There is no mass.      Tenderness: There is no abdominal tenderness.   Musculoskeletal:         General: Normal range of motion.      Cervical back: Normal range of motion and neck supple.   Skin:     General: Skin is warm and dry.   Neurological:      General: No focal deficit present.      Mental Status: He is alert.   Psychiatric:         Mood and Affect: Mood normal.           Results     Lab Results   Component Value Date    WBC 10.17 05/05/2025    RBC 5.68 05/05/2025    HGB 15.3 05/05/2025    HCT 47.6 05/05/2025    MCV 83.8 05/05/2025    MCH 26.9 (L) 05/05/2025    MCHC 32.1 (L) 05/05/2025    RDW 13.2 05/05/2025     05/05/2025    MPV 10.1 05/05/2025     Sodium   Date Value Ref Range Status   05/05/2025 140 136 - 145 mmol/L Final   04/15/2020 138 136 - 145 mmol/L Final     Potassium   Date Value Ref Range Status   05/05/2025 4.4 3.5 - 5.1 mmol/L Final   04/15/2020 3.2 (L) 3.5 - 5.1 mmol/L Final     Chloride   Date Value Ref Range Status   05/05/2025 102 98 - 107 mmol/L Final   04/15/2020 112 (H) 100 - 109 mmol/L Final     CO2   Date Value Ref Range Status   05/05/2025 30 (H) 22 - 29 mmol/L Final     Carbon Dioxide   Date Value Ref Range Status   04/15/2020 18 (L) 22 - 33 mmol/L Final     Glucose   Date Value Ref Range Status   05/05/2025 254 (H) 74 - 100 mg/dL Final     Blood Urea Nitrogen   Date Value Ref Range Status   05/05/2025 11.4 8.9 - 20.6 mg/dL Final   04/15/2020 11 5 - 25 mg/dL Final     Creatinine   Date Value Ref Range Status   05/05/2025 1.03 0.72 - 1.25 mg/dL Final   04/15/2020 1.05 0.57 - 1.25 mg/dL Final     Calcium   Date Value Ref Range Status   05/05/2025 9.6 8.4 - 10.2 mg/dL Final   04/15/2020 8.5 (L) 8.8 - 10.6 mg/dL Final     Protein Total   Date Value Ref Range Status   05/05/2025 7.5 6.4 - 8.3 gm/dL Final     Albumin   Date Value Ref Range Status   05/05/2025 4.0 3.5 - 5.0 g/dL Final     Bilirubin Total    Date Value Ref Range Status   05/05/2025 0.4 <=1.5 mg/dL Final     ALP   Date Value Ref Range Status   05/05/2025 71 40 - 150 unit/L Final     AST   Date Value Ref Range Status   05/05/2025 13 11 - 45 unit/L Final     ALT   Date Value Ref Range Status   05/05/2025 20 0 - 55 unit/L Final     Anion Gap   Date Value Ref Range Status   04/15/2020 8 8 - 16 mmol/L Final     eGFR    Date Value Ref Range Status   04/15/2020 93 >=60 mL/min/1.73mSq Final     Lab Results   Component Value Date    CHOL 160 05/05/2025     Lab Results   Component Value Date    HDL 57 05/05/2025     Lab Results   Component Value Date    TRIG 79 05/05/2025     Lab Results   Component Value Date    VLDL 16 05/05/2025     Lab Results   Component Value Date    LDL 87.00 05/05/2025     Lab Results   Component Value Date    TSH 1.205 05/05/2025     Lab Results   Component Value Date    PHUR 5.5 05/05/2025    PROTEINUA Negative 05/05/2025    GLUCUA 4+ (A) 05/05/2025    OCCULTUA Negative 05/05/2025    NITRITE Negative 05/05/2025    LEUKOCYTESUR Negative 05/05/2025     Lab Results   Component Value Date    HGBA1C 11.3 (H) 05/05/2025    HGBA1C >14.0 (H) 04/13/2020           Assessment         ICD-10-CM ICD-9-CM   1. Chronic pain of left knee  M25.562 719.46    G89.29 338.29   2. Primary hypertension  I10 401.9   3. Type 2 diabetes mellitus with hyperglycemia, without long-term current use of insulin  E11.65 250.00     790.29   4. Erectile dysfunction, unspecified erectile dysfunction type  N52.9 607.84   5. Need for hepatitis C screening test  Z11.59 V73.89       Plan      Problem List Items Addressed This Visit       Type 2 diabetes mellitus with hyperglycemia, without long-term current use of insulin    Current Assessment & Plan   A1C 11.3  Continue glipizide-metformin 5-500mg BID and lovastatin 40mg  Start ozempic 0.25mg x 4 weeks then inc to 0.5mg weekly  Follow ADA Diet. Avoid soda, simple sweets, and limit rice/pasta/breads/starches  (no more than 45-50 grams per meal).  Maintain healthy weight with goal BMI <30.  Exercise 5 times per week for 30 minutes per day.  Stressed importance of daily foot exams.  Stressed importance of annual dilated eye exam.           Relevant Medications    semaglutide (OZEMPIC) 0.25 mg or 0.5 mg (2 mg/3 mL) pen injector    Other Relevant Orders    Hemoglobin A1C    Comprehensive Metabolic Panel    Microalbumin/Creatinine Ratio, Urine    Left knee pain - Primary    Current Assessment & Plan   Xrays on chart- unremarkable  Diclofenac BID PRN  Currently in PT, ortho referral placed.  Perform knee stretches daily  Exercise as tolerated (low impact)  Avoid activities that increase the pain or cause stiffness.  Apply heating pad, ice pack, OTC topical as needed.  Massage to loosen muscles.  Continues NSAID as needed  F/U if worsening          Relevant Medications    tiZANidine (ZANAFLEX) 4 MG tablet    Other Relevant Orders    Ambulatory referral/consult to Orthopedics    Primary hypertension    Current Assessment & Plan   At goal  Continue lisinopril 10mg  Low Sodium Diet (DASH Diet - Less than 2 grams of sodium per day).  Monitor blood pressure daily and log. Report consistent numbers greater than 140/90.  Maintain healthy weight with goal BMI <30. Exercise 30 minutes per day, 5 days per week.         Relevant Medications    lisinopriL 10 MG tablet    Erectile dysfunction    Current Assessment & Plan   Refilled viagra PRN   Report to the ED immediately if you experience any of the following symptoms:  chest pain, lightheadedness, dizziness, SOB, hypotension, nausea, vomiting, or an erection that lasts longer than 4 hours.   Stop taking the medicine immediately!            Relevant Medications    sildenafiL (VIAGRA) 50 MG tablet     Other Visit Diagnoses         Need for hepatitis C screening test        Relevant Orders    Hepatitis C antibody            Future Appointments   Date Time Provider Department Center    5/28/2025  2:40 PM Soraya Smallwood FNP [facility]   9/2/2025  7:30 AM PROVID[facility], MUNIRA [facility]   9/26/2025  9:40 AM Soraya Smallwood FNP [facility]            Signature:      [facility]  [address] [facility] [address]    Date of encounter: 5/28/25

## 2025-06-26 ENCOUNTER — OFFICE VISIT (OUTPATIENT)
Dept: ORTHOPEDICS | Facility: CLINIC | Age: 50
End: 2025-06-26
Payer: MEDICAID

## 2025-06-26 ENCOUNTER — HOSPITAL ENCOUNTER (OUTPATIENT)
Dept: RADIOLOGY | Facility: HOSPITAL | Age: 50
Discharge: HOME | End: 2025-06-26
Attending: FAMILY MEDICINE
Payer: MEDICAID

## 2025-06-26 VITALS
DIASTOLIC BLOOD PRESSURE: 94 MMHG | SYSTOLIC BLOOD PRESSURE: 139 MMHG | HEART RATE: 99 BPM | HEIGHT: 69 IN | TEMPERATURE: 98 F | WEIGHT: 213.88 LBS | BODY MASS INDEX: 31.68 KG/M2

## 2025-06-26 DIAGNOSIS — M22.2X2 PATELLOFEMORAL PAIN SYNDROME OF LEFT KNEE: Primary | ICD-10-CM

## 2025-06-26 DIAGNOSIS — G89.29 CHRONIC PAIN OF LEFT KNEE: ICD-10-CM

## 2025-06-26 DIAGNOSIS — M25.562 CHRONIC PAIN OF LEFT KNEE: ICD-10-CM

## 2025-06-26 PROCEDURE — 73564 X-RAY EXAM KNEE 4 OR MORE: CPT | Mod: TC,LT

## 2025-06-26 PROCEDURE — 99214 OFFICE O/P EST MOD 30 MIN: CPT | Mod: PBBFAC,25
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
## 2025-07-16 ENCOUNTER — TELEPHONE (OUTPATIENT)
Dept: INTERNAL MEDICINE | Facility: CLINIC | Age: 50
End: 2025-07-16
Payer: MEDICAID

## 2025-07-16 DIAGNOSIS — E11.65 TYPE 2 DIABETES MELLITUS WITH HYPERGLYCEMIA, WITHOUT LONG-TERM CURRENT USE OF INSULIN: ICD-10-CM

## 2025-07-16 RX ORDER — GLIPIZIDE AND METFORMIN HCL 5; 500 MG/1; MG/1
2 TABLET, FILM COATED ORAL 2 TIMES DAILY
Qty: 360 TABLET | Refills: 2 | Status: SHIPPED | OUTPATIENT
Start: 2025-07-16 | End: 2026-04-12

## 2025-07-16 NOTE — TELEPHONE ENCOUNTER
Called to inform pt that he should have refills left at his pharmacy.          Copied from CRM #[id number]. Topic: Medications - Medication Refill  >> Jul 15, 2025  3:03 PM Paris wrote:  .Who Called: Ziyad Bryant    Refill or New Rx:Refill  RX Name and Strength:glipizide-metformin (METAGLIP) 5-500 mg per tablet  How is the patient currently taking it? (ex. 1XDay):2x total of 4 pills daily   Is this a 30 day or 90 day RX:30  Local or Mail Order:local   List of preferred pharmacies on file (remove unneeded): [unfilled]  If different Pharmacy is requested, enter Pharmacy information here including location and phone [facility]    Ordering Provider:avinash       Preferred Method of Contact: Phone Call  Patient's Preferred Phone Number on File: [phone]   Best Call Back Number, if different:  Additional Information: pt statges that he ran out of the medication mid refill

## 2025-07-22 ENCOUNTER — TELEPHONE (OUTPATIENT)
Dept: INTERNAL MEDICINE | Facility: CLINIC | Age: 50
End: 2025-07-22
Payer: MEDICAID

## 2025-07-22 NOTE — TELEPHONE ENCOUNTER
Copied from CRM #[id number]. Topic: Medications - Medication Refill  >> Jul 22, 2025  9:51 AM Estephania wrote:  Who Called: Ziyad Gonzalezhan    Refill or New Rx:Refill    RX Name and Strength: glipizide-metformin (METAGLIP) 5-500 mg per tablet    How is the patient currently taking it? (ex. 1XDay): 4xday    Is this a 30 day or 90 day RX: n/a    Local or Mail Order: LOCAL    List of preferred pharmacies on file (remove unneeded): [facility] [address] [place], [place] [address]   Phone: [phone]  Fax: [phone]    Ordering Provider: TONJA    Preferred Method of Contact: Phone Call    Patient's Preferred Phone Number on File: [phone]     Best Call Back Number, if different: na    Additional Information:  pt states he called last week to have meds refilled. Since Tonja increased his dosage to 4xday he ran out faster. Please send new dosage refills to pharmacy. Pt states he is having side effects from not having the meds for a week, unquenchable thirst, frequent urination, smelly urine.    [facility] told the pt that they did not receive the updated RX

## 2025-08-19 ENCOUNTER — TELEPHONE (OUTPATIENT)
Dept: ORTHOPEDICS | Facility: CLINIC | Age: 50
End: 2025-08-19
Payer: MEDICAID